# Patient Record
Sex: MALE | Race: BLACK OR AFRICAN AMERICAN | NOT HISPANIC OR LATINO
[De-identification: names, ages, dates, MRNs, and addresses within clinical notes are randomized per-mention and may not be internally consistent; named-entity substitution may affect disease eponyms.]

---

## 2023-01-09 PROBLEM — Z00.00 ENCOUNTER FOR PREVENTIVE HEALTH EXAMINATION: Status: ACTIVE | Noted: 2023-01-09

## 2023-01-18 ENCOUNTER — TRANSCRIPTION ENCOUNTER (OUTPATIENT)
Age: 68
End: 2023-01-18

## 2023-01-24 ENCOUNTER — TRANSCRIPTION ENCOUNTER (OUTPATIENT)
Age: 68
End: 2023-01-24

## 2024-06-04 ENCOUNTER — TRANSCRIPTION ENCOUNTER (OUTPATIENT)
Age: 69
End: 2024-06-04

## 2024-06-05 ENCOUNTER — INPATIENT (INPATIENT)
Facility: HOSPITAL | Age: 69
LOS: 7 days | Discharge: NURSING, REHAB OR LONG-TERM CARE | End: 2024-06-13
Attending: HOSPITALIST | Admitting: HOSPITALIST
Payer: MEDICARE

## 2024-06-05 VITALS
RESPIRATION RATE: 16 BRPM | TEMPERATURE: 99 F | WEIGHT: 250 LBS | DIASTOLIC BLOOD PRESSURE: 78 MMHG | SYSTOLIC BLOOD PRESSURE: 136 MMHG | HEART RATE: 78 BPM | OXYGEN SATURATION: 98 % | HEIGHT: 74 IN

## 2024-06-05 DIAGNOSIS — F20.9 SCHIZOPHRENIA, UNSPECIFIED: ICD-10-CM

## 2024-06-05 DIAGNOSIS — M27.2 INFLAMMATORY CONDITIONS OF JAWS: ICD-10-CM

## 2024-06-05 DIAGNOSIS — E87.1 HYPO-OSMOLALITY AND HYPONATREMIA: ICD-10-CM

## 2024-06-05 DIAGNOSIS — J44.9 CHRONIC OBSTRUCTIVE PULMONARY DISEASE, UNSPECIFIED: ICD-10-CM

## 2024-06-05 DIAGNOSIS — K12.2 CELLULITIS AND ABSCESS OF MOUTH: ICD-10-CM

## 2024-06-05 DIAGNOSIS — Z29.9 ENCOUNTER FOR PROPHYLACTIC MEASURES, UNSPECIFIED: ICD-10-CM

## 2024-06-05 DIAGNOSIS — Z87.438 PERSONAL HISTORY OF OTHER DISEASES OF MALE GENITAL ORGANS: ICD-10-CM

## 2024-06-05 DIAGNOSIS — I10 ESSENTIAL (PRIMARY) HYPERTENSION: ICD-10-CM

## 2024-06-05 LAB
ADD ON TEST-SPECIMEN IN LAB: SIGNIFICANT CHANGE UP
ALBUMIN SERPL ELPH-MCNC: 2.9 G/DL — LOW (ref 3.3–5)
ALP SERPL-CCNC: 71 U/L — SIGNIFICANT CHANGE UP (ref 40–120)
ALT FLD-CCNC: 9 U/L — SIGNIFICANT CHANGE UP (ref 4–41)
ANION GAP SERPL CALC-SCNC: 14 MMOL/L — SIGNIFICANT CHANGE UP (ref 7–14)
APPEARANCE UR: ABNORMAL
APTT BLD: 35.7 SEC — HIGH (ref 24.5–35.6)
AST SERPL-CCNC: 8 U/L — SIGNIFICANT CHANGE UP (ref 4–40)
BACTERIA # UR AUTO: NEGATIVE /HPF — SIGNIFICANT CHANGE UP
BASOPHILS # BLD AUTO: 0.04 K/UL — SIGNIFICANT CHANGE UP (ref 0–0.2)
BASOPHILS NFR BLD AUTO: 0.3 % — SIGNIFICANT CHANGE UP (ref 0–2)
BILIRUB SERPL-MCNC: 0.4 MG/DL — SIGNIFICANT CHANGE UP (ref 0.2–1.2)
BILIRUB UR-MCNC: NEGATIVE — SIGNIFICANT CHANGE UP
BLD GP AB SCN SERPL QL: NEGATIVE — SIGNIFICANT CHANGE UP
BUN SERPL-MCNC: 21 MG/DL — SIGNIFICANT CHANGE UP (ref 7–23)
CALCIUM SERPL-MCNC: 8.9 MG/DL — SIGNIFICANT CHANGE UP (ref 8.4–10.5)
CAST: 0 /LPF — SIGNIFICANT CHANGE UP (ref 0–4)
CHLORIDE SERPL-SCNC: 93 MMOL/L — LOW (ref 98–107)
CO2 SERPL-SCNC: 22 MMOL/L — SIGNIFICANT CHANGE UP (ref 22–31)
COLOR SPEC: YELLOW — SIGNIFICANT CHANGE UP
CREAT ?TM UR-MCNC: 115 MG/DL — SIGNIFICANT CHANGE UP
CREAT SERPL-MCNC: 1.17 MG/DL — SIGNIFICANT CHANGE UP (ref 0.5–1.3)
DIFF PNL FLD: ABNORMAL
EGFR: 68 ML/MIN/1.73M2 — SIGNIFICANT CHANGE UP
EOSINOPHIL # BLD AUTO: 0.12 K/UL — SIGNIFICANT CHANGE UP (ref 0–0.5)
EOSINOPHIL NFR BLD AUTO: 0.9 % — SIGNIFICANT CHANGE UP (ref 0–6)
GLUCOSE BLDC GLUCOMTR-MCNC: 97 MG/DL — SIGNIFICANT CHANGE UP (ref 70–99)
GLUCOSE SERPL-MCNC: 84 MG/DL — SIGNIFICANT CHANGE UP (ref 70–99)
GLUCOSE UR QL: NEGATIVE MG/DL — SIGNIFICANT CHANGE UP
HCT VFR BLD CALC: 31.3 % — LOW (ref 39–50)
HGB BLD-MCNC: 10.6 G/DL — LOW (ref 13–17)
IANC: 10.15 K/UL — HIGH (ref 1.8–7.4)
IMM GRANULOCYTES NFR BLD AUTO: 0.8 % — SIGNIFICANT CHANGE UP (ref 0–0.9)
INR BLD: 1.09 RATIO — SIGNIFICANT CHANGE UP (ref 0.85–1.18)
KETONES UR-MCNC: 15 MG/DL
LEUKOCYTE ESTERASE UR-ACNC: ABNORMAL
LYMPHOCYTES # BLD AUTO: 1.46 K/UL — SIGNIFICANT CHANGE UP (ref 1–3.3)
LYMPHOCYTES # BLD AUTO: 10.9 % — LOW (ref 13–44)
MCHC RBC-ENTMCNC: 28.1 PG — SIGNIFICANT CHANGE UP (ref 27–34)
MCHC RBC-ENTMCNC: 33.9 GM/DL — SIGNIFICANT CHANGE UP (ref 32–36)
MCV RBC AUTO: 83 FL — SIGNIFICANT CHANGE UP (ref 80–100)
MONOCYTES # BLD AUTO: 1.54 K/UL — HIGH (ref 0–0.9)
MONOCYTES NFR BLD AUTO: 11.5 % — SIGNIFICANT CHANGE UP (ref 2–14)
NEUTROPHILS # BLD AUTO: 10.15 K/UL — HIGH (ref 1.8–7.4)
NEUTROPHILS NFR BLD AUTO: 75.6 % — SIGNIFICANT CHANGE UP (ref 43–77)
NITRITE UR-MCNC: NEGATIVE — SIGNIFICANT CHANGE UP
NRBC # BLD: 0 /100 WBCS — SIGNIFICANT CHANGE UP (ref 0–0)
NRBC # FLD: 0 K/UL — SIGNIFICANT CHANGE UP (ref 0–0)
OSMOLALITY UR: 564 MOSM/KG — SIGNIFICANT CHANGE UP (ref 50–1200)
PH UR: 6 — SIGNIFICANT CHANGE UP (ref 5–8)
PLATELET # BLD AUTO: 325 K/UL — SIGNIFICANT CHANGE UP (ref 150–400)
POTASSIUM SERPL-MCNC: 4.4 MMOL/L — SIGNIFICANT CHANGE UP (ref 3.5–5.3)
POTASSIUM SERPL-SCNC: 4.4 MMOL/L — SIGNIFICANT CHANGE UP (ref 3.5–5.3)
POTASSIUM UR-SCNC: 34.4 MMOL/L — SIGNIFICANT CHANGE UP
PROT ?TM UR-MCNC: 66 MG/DL — SIGNIFICANT CHANGE UP
PROT SERPL-MCNC: 6.7 G/DL — SIGNIFICANT CHANGE UP (ref 6–8.3)
PROT UR-MCNC: 30 MG/DL
PROT/CREAT UR-RTO: 0.6 RATIO — HIGH (ref 0–0.2)
PROTHROM AB SERPL-ACNC: 12.3 SEC — SIGNIFICANT CHANGE UP (ref 9.5–13)
RBC # BLD: 3.77 M/UL — LOW (ref 4.2–5.8)
RBC # FLD: 14.2 % — SIGNIFICANT CHANGE UP (ref 10.3–14.5)
RBC CASTS # UR COMP ASSIST: 3 /HPF — SIGNIFICANT CHANGE UP (ref 0–4)
RH IG SCN BLD-IMP: POSITIVE — SIGNIFICANT CHANGE UP
SODIUM SERPL-SCNC: 129 MMOL/L — LOW (ref 135–145)
SODIUM UR-SCNC: 62 MMOL/L — SIGNIFICANT CHANGE UP
SP GR SPEC: 1.03 — SIGNIFICANT CHANGE UP (ref 1–1.03)
SQUAMOUS # UR AUTO: 4 /HPF — SIGNIFICANT CHANGE UP (ref 0–5)
TSH SERPL-MCNC: 1.74 UIU/ML — SIGNIFICANT CHANGE UP (ref 0.27–4.2)
UROBILINOGEN FLD QL: 2 MG/DL (ref 0.2–1)
UUN UR-MCNC: 769 MG/DL — SIGNIFICANT CHANGE UP
WBC # BLD: 13.42 K/UL — HIGH (ref 3.8–10.5)
WBC # FLD AUTO: 13.42 K/UL — HIGH (ref 3.8–10.5)
WBC UR QL: 21 /HPF — HIGH (ref 0–5)

## 2024-06-05 PROCEDURE — 99285 EMERGENCY DEPT VISIT HI MDM: CPT

## 2024-06-05 PROCEDURE — 21025 EXCISION OF BONE LOWER JAW: CPT

## 2024-06-05 PROCEDURE — 88305 TISSUE EXAM BY PATHOLOGIST: CPT | Mod: 26

## 2024-06-05 PROCEDURE — 41017 DRAINAGE OF MOUTH LESION: CPT

## 2024-06-05 PROCEDURE — 88311 DECALCIFY TISSUE: CPT | Mod: 26

## 2024-06-05 PROCEDURE — 99223 1ST HOSP IP/OBS HIGH 75: CPT

## 2024-06-05 RX ORDER — OLANZAPINE 15 MG/1
10 TABLET, FILM COATED ORAL AT BEDTIME
Refills: 0 | Status: DISCONTINUED | OUTPATIENT
Start: 2024-06-05 | End: 2024-06-13

## 2024-06-05 RX ORDER — ACETAMINOPHEN 500 MG
650 TABLET ORAL EVERY 6 HOURS
Refills: 0 | Status: DISCONTINUED | OUTPATIENT
Start: 2024-06-05 | End: 2024-06-13

## 2024-06-05 RX ORDER — GABAPENTIN 400 MG/1
1 CAPSULE ORAL
Refills: 0 | DISCHARGE

## 2024-06-05 RX ORDER — GABAPENTIN 400 MG/1
300 CAPSULE ORAL EVERY 8 HOURS
Refills: 0 | Status: DISCONTINUED | OUTPATIENT
Start: 2024-06-05 | End: 2024-06-13

## 2024-06-05 RX ORDER — ALBUTEROL 90 UG/1
2 AEROSOL, METERED ORAL
Refills: 0 | DISCHARGE

## 2024-06-05 RX ORDER — BENZTROPINE MESYLATE 1 MG
1 TABLET ORAL
Refills: 0 | DISCHARGE

## 2024-06-05 RX ORDER — DOXAZOSIN MESYLATE 4 MG
1 TABLET ORAL
Refills: 0 | DISCHARGE

## 2024-06-05 RX ORDER — LANOLIN ALCOHOL/MO/W.PET/CERES
3 CREAM (GRAM) TOPICAL AT BEDTIME
Refills: 0 | Status: DISCONTINUED | OUTPATIENT
Start: 2024-06-05 | End: 2024-06-13

## 2024-06-05 RX ORDER — SENNA PLUS 8.6 MG/1
2 TABLET ORAL AT BEDTIME
Refills: 0 | Status: DISCONTINUED | OUTPATIENT
Start: 2024-06-05 | End: 2024-06-13

## 2024-06-05 RX ORDER — FINASTERIDE 5 MG/1
1 TABLET, FILM COATED ORAL
Refills: 0 | DISCHARGE

## 2024-06-05 RX ORDER — DOXAZOSIN MESYLATE 4 MG
4 TABLET ORAL AT BEDTIME
Refills: 0 | Status: DISCONTINUED | OUTPATIENT
Start: 2024-06-05 | End: 2024-06-13

## 2024-06-05 RX ORDER — SODIUM CHLORIDE 9 MG/ML
1000 INJECTION INTRAMUSCULAR; INTRAVENOUS; SUBCUTANEOUS
Refills: 0 | Status: DISCONTINUED | OUTPATIENT
Start: 2024-06-05 | End: 2024-06-09

## 2024-06-05 RX ORDER — HYDROMORPHONE HYDROCHLORIDE 2 MG/ML
1 INJECTION INTRAMUSCULAR; INTRAVENOUS; SUBCUTANEOUS EVERY 6 HOURS
Refills: 0 | Status: DISCONTINUED | OUTPATIENT
Start: 2024-06-05 | End: 2024-06-07

## 2024-06-05 RX ORDER — VANCOMYCIN HCL 1 G
1000 VIAL (EA) INTRAVENOUS ONCE
Refills: 0 | Status: COMPLETED | OUTPATIENT
Start: 2024-06-05 | End: 2024-06-05

## 2024-06-05 RX ORDER — OLANZAPINE 15 MG/1
1 TABLET, FILM COATED ORAL
Refills: 0 | DISCHARGE

## 2024-06-05 RX ORDER — DIVALPROEX SODIUM 500 MG/1
250 TABLET, DELAYED RELEASE ORAL EVERY 8 HOURS
Refills: 0 | Status: DISCONTINUED | OUTPATIENT
Start: 2024-06-05 | End: 2024-06-13

## 2024-06-05 RX ORDER — BUDESONIDE AND FORMOTEROL FUMARATE DIHYDRATE 160; 4.5 UG/1; UG/1
2 AEROSOL RESPIRATORY (INHALATION)
Refills: 0 | DISCHARGE

## 2024-06-05 RX ORDER — ONDANSETRON 8 MG/1
4 TABLET, FILM COATED ORAL ONCE
Refills: 0 | Status: DISCONTINUED | OUTPATIENT
Start: 2024-06-05 | End: 2024-06-06

## 2024-06-05 RX ORDER — PIPERACILLIN AND TAZOBACTAM 4; .5 G/20ML; G/20ML
3.38 INJECTION, POWDER, LYOPHILIZED, FOR SOLUTION INTRAVENOUS ONCE
Refills: 0 | Status: COMPLETED | OUTPATIENT
Start: 2024-06-05 | End: 2024-06-05

## 2024-06-05 RX ORDER — SODIUM CHLORIDE 9 MG/ML
1000 INJECTION, SOLUTION INTRAVENOUS
Refills: 0 | Status: DISCONTINUED | OUTPATIENT
Start: 2024-06-05 | End: 2024-06-06

## 2024-06-05 RX ORDER — OLANZAPINE 15 MG/1
5 TABLET, FILM COATED ORAL ONCE
Refills: 0 | Status: COMPLETED | OUTPATIENT
Start: 2024-06-05 | End: 2024-06-05

## 2024-06-05 RX ORDER — DIVALPROEX SODIUM 500 MG/1
1 TABLET, DELAYED RELEASE ORAL
Refills: 0 | DISCHARGE

## 2024-06-05 RX ORDER — BENZTROPINE MESYLATE 1 MG
0.5 TABLET ORAL EVERY 12 HOURS
Refills: 0 | Status: DISCONTINUED | OUTPATIENT
Start: 2024-06-05 | End: 2024-06-13

## 2024-06-05 RX ORDER — BUDESONIDE AND FORMOTEROL FUMARATE DIHYDRATE 160; 4.5 UG/1; UG/1
2 AEROSOL RESPIRATORY (INHALATION)
Refills: 0 | Status: DISCONTINUED | OUTPATIENT
Start: 2024-06-05 | End: 2024-06-13

## 2024-06-05 RX ORDER — PIPERACILLIN AND TAZOBACTAM 4; .5 G/20ML; G/20ML
3.38 INJECTION, POWDER, LYOPHILIZED, FOR SOLUTION INTRAVENOUS EVERY 8 HOURS
Refills: 0 | Status: DISCONTINUED | OUTPATIENT
Start: 2024-06-05 | End: 2024-06-13

## 2024-06-05 RX ORDER — OXYCODONE HYDROCHLORIDE 5 MG/1
5 TABLET ORAL ONCE
Refills: 0 | Status: DISCONTINUED | OUTPATIENT
Start: 2024-06-05 | End: 2024-06-06

## 2024-06-05 RX ORDER — LABETALOL HCL 100 MG
200 TABLET ORAL EVERY 12 HOURS
Refills: 0 | Status: DISCONTINUED | OUTPATIENT
Start: 2024-06-05 | End: 2024-06-13

## 2024-06-05 RX ORDER — ONDANSETRON 8 MG/1
4 TABLET, FILM COATED ORAL EVERY 8 HOURS
Refills: 0 | Status: DISCONTINUED | OUTPATIENT
Start: 2024-06-05 | End: 2024-06-13

## 2024-06-05 RX ORDER — LABETALOL HCL 100 MG
1 TABLET ORAL
Refills: 0 | DISCHARGE

## 2024-06-05 RX ORDER — FLUPHENAZINE HYDROCHLORIDE 1 MG/1
62.5 TABLET, FILM COATED ORAL
Refills: 0 | DISCHARGE

## 2024-06-05 RX ORDER — FINASTERIDE 5 MG/1
5 TABLET, FILM COATED ORAL DAILY
Refills: 0 | Status: DISCONTINUED | OUTPATIENT
Start: 2024-06-05 | End: 2024-06-13

## 2024-06-05 RX ORDER — VANCOMYCIN HCL 1 G
1250 VIAL (EA) INTRAVENOUS EVERY 12 HOURS
Refills: 0 | Status: DISCONTINUED | OUTPATIENT
Start: 2024-06-05 | End: 2024-06-06

## 2024-06-05 RX ORDER — HYDROMORPHONE HYDROCHLORIDE 2 MG/ML
0.5 INJECTION INTRAMUSCULAR; INTRAVENOUS; SUBCUTANEOUS
Refills: 0 | Status: DISCONTINUED | OUTPATIENT
Start: 2024-06-05 | End: 2024-06-07

## 2024-06-05 RX ORDER — HYDROMORPHONE HYDROCHLORIDE 2 MG/ML
0.5 INJECTION INTRAMUSCULAR; INTRAVENOUS; SUBCUTANEOUS
Refills: 0 | Status: DISCONTINUED | OUTPATIENT
Start: 2024-06-05 | End: 2024-06-06

## 2024-06-05 RX ADMIN — PIPERACILLIN AND TAZOBACTAM 25 GRAM(S): 4; .5 INJECTION, POWDER, LYOPHILIZED, FOR SOLUTION INTRAVENOUS at 10:03

## 2024-06-05 RX ADMIN — PIPERACILLIN AND TAZOBACTAM 200 GRAM(S): 4; .5 INJECTION, POWDER, LYOPHILIZED, FOR SOLUTION INTRAVENOUS at 05:27

## 2024-06-05 RX ADMIN — Medication 166.67 MILLIGRAM(S): at 19:14

## 2024-06-05 RX ADMIN — SENNA PLUS 2 TABLET(S): 8.6 TABLET ORAL at 23:55

## 2024-06-05 RX ADMIN — HYDROMORPHONE HYDROCHLORIDE 1 MILLIGRAM(S): 2 INJECTION INTRAMUSCULAR; INTRAVENOUS; SUBCUTANEOUS at 07:19

## 2024-06-05 RX ADMIN — SODIUM CHLORIDE 75 MILLILITER(S): 9 INJECTION, SOLUTION INTRAVENOUS at 22:35

## 2024-06-05 RX ADMIN — Medication 250 MILLIGRAM(S): at 06:08

## 2024-06-05 RX ADMIN — DIVALPROEX SODIUM 250 MILLIGRAM(S): 500 TABLET, DELAYED RELEASE ORAL at 14:29

## 2024-06-05 RX ADMIN — GABAPENTIN 300 MILLIGRAM(S): 400 CAPSULE ORAL at 14:29

## 2024-06-05 RX ADMIN — DIVALPROEX SODIUM 250 MILLIGRAM(S): 500 TABLET, DELAYED RELEASE ORAL at 23:54

## 2024-06-05 RX ADMIN — Medication 0.5 MILLIGRAM(S): at 19:14

## 2024-06-05 RX ADMIN — Medication 200 MILLIGRAM(S): at 19:13

## 2024-06-05 RX ADMIN — SODIUM CHLORIDE 75 MILLILITER(S): 9 INJECTION INTRAMUSCULAR; INTRAVENOUS; SUBCUTANEOUS at 07:14

## 2024-06-05 RX ADMIN — FINASTERIDE 5 MILLIGRAM(S): 5 TABLET, FILM COATED ORAL at 14:29

## 2024-06-05 RX ADMIN — Medication 100 MILLIGRAM(S): at 04:40

## 2024-06-05 RX ADMIN — Medication 4 MILLIGRAM(S): at 23:55

## 2024-06-05 RX ADMIN — OLANZAPINE 5 MILLIGRAM(S): 15 TABLET, FILM COATED ORAL at 16:14

## 2024-06-05 RX ADMIN — BUDESONIDE AND FORMOTEROL FUMARATE DIHYDRATE 2 PUFF(S): 160; 4.5 AEROSOL RESPIRATORY (INHALATION) at 10:03

## 2024-06-05 RX ADMIN — PIPERACILLIN AND TAZOBACTAM 25 GRAM(S): 4; .5 INJECTION, POWDER, LYOPHILIZED, FOR SOLUTION INTRAVENOUS at 23:11

## 2024-06-05 RX ADMIN — GABAPENTIN 300 MILLIGRAM(S): 400 CAPSULE ORAL at 23:10

## 2024-06-05 RX ADMIN — OLANZAPINE 10 MILLIGRAM(S): 15 TABLET, FILM COATED ORAL at 23:55

## 2024-06-05 NOTE — ED ADULT NURSE NOTE - CHIEF COMPLAINT QUOTE
Pt transferred from [facility] for OMFS consult, CT showed osteomyelitis to right mandible with abscess. Arrives with 18GIV in [facility], received 5mg of Morphine and 900mg of Clindamycin at 2006. Past medical history of schizophrenia.

## 2024-06-05 NOTE — ED PROVIDER NOTE - ATTENDING CONTRIBUTION TO CARE
HPI: 68-year-old male history of schizophrenia, hypertension, presents as transfer from [facility] for osteomyelitis of the right mandible with abscess.  Patient states 4 days ago woke up with right facial swelling that has progressively worsened and became tender.  Denies fevers, trouble swallowing, neck pain, recent falls or injuries.      EXAM: Afebrile, not tachycardic, right mandibular swelling with tenderness to palpation.  No gum fluctuance or discharge, no teeth, no dentures. lungs clear to auscultation.  abd softnontender, moving all 4 ext actively without gross MSK deformities.     MDM: 67 yo M with history of Schizophrenia, HTN that was transferred from [facility] for findings concerning for osteomyelitis of right mandible where he has swelling. Per chart review patient received morphine and clindamycin at outside hospital appox 8 hours prior to arrival. .  Will continue antibiotics and discuss with OMFS and appreciate recommendations.

## 2024-06-05 NOTE — ED ADULT TRIAGE NOTE - CHIEF COMPLAINT QUOTE
Pt transferred from [facility] for OMFS consult, CT showed osteomyelitis to right mandible with abscess. Arrives with 18GIV in [facility], received 5mg of Morphine and 900mg of Clindamycin at 2006. Past medical history of schizophrenia.

## 2024-06-05 NOTE — ED PROVIDER NOTE - PHYSICAL EXAMINATION
GEN: NAD. A&Ox3. Non-toxic appearing.  HEENT: diffuse right mandibular swelling with ttp, neck with full ROM EOMI, PERRL, no scleral icterus, moist MM, edentulous   CARDIAC: RRR, S1, S2, no murmur. Radial pulses  present and symmetric b/l  PULM: CTA B/L no wheeze, rhonchi, rales.   ABD: soft NT, ND, no rebound no guarding  MSK: Moving all extremities, no edema  NEURO: no focal neurological deficits, CN 2-12 grossly intact  SKIN: warm, dry, no rash.

## 2024-06-05 NOTE — PATIENT PROFILE ADULT - DO YOU LACK THE NECESSARY SUPPORT TO HELP YOU COPE WITH LIFE CHALLENGES?
Chief Complaint   Patient presents with    New Patient     new - liver transplant eval       Vitals were taken, medications reconciled.    Zayra Jackson, Facilitator   7:50 AM    
no

## 2024-06-05 NOTE — H&P ADULT - PROBLEM SELECTOR PLAN 2
Suspect SIADH due to pain; Sodium 129    -Pain control as above  -Check TSH (added on)   -Monitor response to IV NS  -f/u  repeat BMP ordered for 10am (primary team)

## 2024-06-05 NOTE — PROGRESS NOTE ADULT - PROBLEM SELECTOR PLAN 2
Suspect SIADH due to pain; Sodium 129    -Pain control as above  -Check TSH (added on)   -Monitor response to IV NS  -f/u  repeat BMP ordered for 10am (primary team) Suspect SIADH due to pain; Sodium 129    -Pain control as above  -Check TSH (added on)   -Monitor response to IV NS  -f/u urine lytes

## 2024-06-05 NOTE — PROGRESS NOTE ADULT - ATTENDING COMMENTS
67yo male, smoker, ambulatory with a walker, history of schizophrenia (hallucinations with televisions), HTN, COPD, BPH,  muscle weakness, spondylosis with myelopathy, mild pulmonary HTN and moderate tricuspid regurgitation (TTE 2022); Presents as transfer from [facility] w mandible pain found w severe bony erosion of the right mandible with abscess   - OMFS plan for add-on OR today - on zosyn, will add vanco, ID consult - f.u OR cx and path  -hyponatremia - clinically euvolemic - check lytes and trial of IVF  rest of care as above

## 2024-06-05 NOTE — PROGRESS NOTE ADULT - PROBLEM SELECTOR PLAN 3
No formal dx per the housing documentation, however, pt on typical COPD medications and active smoker;    -c/w Symbicort   -Would start ppx Duoneb post OR   -Monitor pulse-ox closely No formal dx per the housing documentation, however, pt on typical COPD medications and active smoker    -c/w Symbicort   -Would start ppx Duoneb post OR   -Monitor pulse-ox closely

## 2024-06-05 NOTE — PRE-OP CHECKLIST - BOWEL PREP
n/a Xolair Counseling:  Patient informed of potential adverse effects including but not limited to fever, muscle aches, rash and allergic reactions.  The patient verbalized understanding of the proper use and possible adverse effects of Xolair.  All of the patient's questions and concerns were addressed.

## 2024-06-05 NOTE — H&P ADULT - TIME BILLING
Reviewed admission imaging reports, and admission labs prior to the encounter with  the patient   Reviewed Triage and ED course/ documentation   Reviewed consultants notes, including::: OMFS  Performed full physical exam and ROS  Reviewed transfer documents from [facility]  Obtained list of multiple home medications and performed home medications reconciliation on EMR  Discussed treatment and further plans in simple terms with the patient   Ordered or reviewed new admission medications and placed or reviewed all hospitalization admission orders  Managed (continued, held or adjusted doses)  multiple home medications   Ordered  or reviewed orders of  new imaging and new lab w/up

## 2024-06-05 NOTE — H&P ADULT - HISTORY OF PRESENT ILLNESS
68-year-old male history of schizophrenia, hypertension, presents as transfer from [facility] for osteomyelitis of the right mandible with abscess.  Patient states 4 days ago woke up with right facial swelling that has progressively worsened and became tender. Reports no fever, difficulty or pain on swallowing, neck pain, recent falls or injuries.     FYI: received morphine and clindamycin at outside hospital. OMFS c/s 68-year-old male, smoker, ambulatory with a walker,  history of schizophrenia (hallucinations with televisions), hypertension, ?COPD, BPH,  muscle weakness, spondylosis with myelopathy, presents as transfer from [facility] for osteomyelitis of the right mandible with abscess.  Patient states 4 days ago woke up with right facial swelling that has progressively worsened and became tender. Reports no fever, difficulty or pain on swallowing, neck pain, recent falls or injuries.     FYI: received morphine and clindamycin at outside hospital. OMFS c/s 68-year-old male, smoker, ambulatory with a walker, history of schizophrenia (hallucinations with televisions), hypertension, COPD, BPH,  muscle weakness, spondylosis with myelopathy, mild pulmonary HTN and moderate tricuspid regurgitation (TTE 2022); Pt presents as transfer from [facility] for osteomyelitis of the right mandible with abscess.  Patient states 4 days ago woke up with right facial swelling that has progressively worsened and became tender. Reports no fever, difficulty or pain on swallowing, neck pain, recent falls or injuries.     FYI: received morphine and clindamycin at outside hospital. OMFS c/s

## 2024-06-05 NOTE — PROGRESS NOTE ADULT - SUBJECTIVE AND OBJECTIVE BOX
*******************************  Blanca Tucker MD (PGY-1)  Internal Medicine  Contact via [employer] TEAMS  *******************************    INTERVAL HPI/OVERNIGHT EVENTS:      OBJECTIVE  T(C): 36.6 (06-05-24 @ 08:00), Max: 37 (06-05-24 @ 03:56)  HR: 76 (06-05-24 @ 08:00) (76 - 78)  BP: 118/67 (06-05-24 @ 08:00) (118/67 - 136/78)  RR: 19 (06-05-24 @ 08:00) (16 - 19)  SpO2: 97% (06-05-24 @ 08:00) (97% - 98%)      PHYSICAL EXAM  General:  HEENT:  Cardiovascular:  Pulmonary:  GI:  :  Neuro:  Psych:          IMAGING:     *******************************  Blanca Tucker MD (PGY-1)  Internal Medicine  Contact via [employer] TEAMS  *******************************    INTERVAL HPI/OVERNIGHT EVENTS:  Admitted overnight for mandibular osteomyelitis w/ abscess, planned for I&D today with OMFS.     OBJECTIVE  T(C): 36.6 (06-05-24 @ 08:00), Max: 37 (06-05-24 @ 03:56)  HR: 76 (06-05-24 @ 08:00) (76 - 78)  BP: 118/67 (06-05-24 @ 08:00) (118/67 - 136/78)  RR: 19 (06-05-24 @ 08:00) (16 - 19)  SpO2: 97% (06-05-24 @ 08:00) (97% - 98%)      PHYSICAL EXAM  GENERAL: NAD, lying in bed comfortably  EYES: EOMI, PERRLA, conjunctiva and sclera clear  HEENT : +R. sided facial and submandibular edema and tenderness, muffled voice and mild trismus, no drooling  HEART: Regular rate and rhythm, no murmurs, rubs, or gallops  LUNGS: Unlabored respirations.  Clear to auscultation bilaterally, no crackles, wheezing, or rhonchi  ABDOMEN: Soft, nontender, nondistended, +BS  EXTREMITIES: 2+ peripheral pulses bilaterally. No clubbing, cyanosis, or edema  NERVOUS SYSTEM:  A&Ox3, moving all extremities, no focal deficits         IMAGING:

## 2024-06-05 NOTE — PATIENT PROFILE ADULT - NSPROEXTENSIONSOFSELF_GEN_A_NUR
Pt given discharge instructions, patient education, 2 prescriptions, and follow up information. Pt verbalizes understanding. All questions answered. Pt discharged to home in private vehicle, ambulatory. Pt A/Ox4, RA, pain controlled.
none

## 2024-06-05 NOTE — PATIENT PROFILE ADULT - FALL HARM RISK - HARM RISK INTERVENTIONS
Assistance with ambulation/Assistance OOB with selected safe patient handling equipment/Communicate Risk of Fall with Harm to all staff/Reinforce activity limits and safety measures with patient and family/Tailored Fall Risk Interventions/Use of alarms - bed, chair and/or voice tab/Visual Cue: Yellow wristband and red socks/Bed in lowest position, wheels locked, appropriate side rails in place/Call bell, personal items and telephone in reach/Instruct patient to call for assistance before getting out of bed or chair/Non-slip footwear when patient is out of bed/[place] to call system/Physically safe environment - no spills, clutter or unnecessary equipment/Purposeful Proactive Rounding/Room/bathroom lighting operational, light cord in reach

## 2024-06-05 NOTE — PROGRESS NOTE ADULT - PROBLEM SELECTOR PLAN 5
On Prolixin Decanoate [id number]     c/w Zyprexa, Depakote  check Valproic Acid level (added on)  check TSH On Prolixin Decanoate [id number]     c/w Zyprexa, Depakote  f/u Valproic Acid level (added on)

## 2024-06-05 NOTE — PATIENT PROFILE ADULT - FUNCTIONAL ASSESSMENT - BASIC MOBILITY 6.
2-calculated by average/Not able to assess (calculate score using [facility] averaging method)

## 2024-06-05 NOTE — ED PROVIDER NOTE - PROGRESS NOTE DETAILS
Discussed with OMFS.  They are unsure if will go to operating room.  Would recommend continuing with IV antibiotics.  Discussed with hospitalist, recommending dose of Vanco and Zosyn at this time.  To be admitted.  -Kayode Rodgers PGY-2

## 2024-06-05 NOTE — H&P ADULT - NSICDXPASTMEDICALHX_GEN_ALL_CORE_FT
PAST MEDICAL HISTORY:  HTN (hypertension)     Schizophrenia      PAST MEDICAL HISTORY:  History of BPH     HTN (hypertension)     Muscle weakness     Schizophrenia

## 2024-06-05 NOTE — PRE-OP CHECKLIST - BSA (M2)
2.32 Suturegard Intro: Intraoperative tissue expansion was performed, utilizing the SUTUREGARD device, in order to reduce wound tension.

## 2024-06-05 NOTE — H&P ADULT - SKIN
warm and dry/color normal Protopic Counseling: Patient may experience a mild burning sensation during topical application. Protopic is not approved in children less than 2 years of age. There have been case reports of hematologic and skin malignancies in patients using topical calcineurin inhibitors although causality is questionable.

## 2024-06-05 NOTE — H&P ADULT - PROBLEM SELECTOR PLAN 5
On Prolixin Decanoate [id number]     c/w Zyprexa, Depakote  check Valproic Acid level (added on)  check TSH

## 2024-06-05 NOTE — CONSULT NOTE ADULT - SUBJECTIVE AND OBJECTIVE BOX
OMFS Consult Note:    ELAINE HERNANDEZ  MRN-[id number]  [facility] ED.    HPI:      PMH: HTN, Schizophrenia  PSH: Pt denies  Meds: Pt not sure of names  All: Haldol, penicillin, Thorazine        Review of systems:  Denies fever, chills. denies LOC. denies nausea/vomiting/headache. denies CP, SOB, cough. Denies palpatitions. denies blurred/double vision. denies dyspahgia, dyspnea.      T(F): 98.6 (06-05-24 @ 03:56), Max: 98.6 (06-05-24 @ 03:56)  HR: 78 (06-05-24 @ 03:56) (78 - 78)  BP: 136/78 (06-05-24 @ 03:56) (136/78 - 136/78)  RR: 16 (06-05-24 @ 03:56) (16 - 16)  SpO2: 98% (06-05-24 @ 03:56) (98% - 98%)              Focused Maxillofacial Exam:  General: AAOx3, NAD  Face: Large R sided facial/ submandibular swelling, mild trismus to 25 mm, no lymphadenopathy. Inferior border of mandible is not palpable on R side  Oral: R posterior vestibular fullness, no purulence intraorally. FOM soft, non-tender. No dysphagia or odynophagia. Pt tolerating secretions.       IMAGING:     OMFS Consult Note:    ELAINE HERNANDEZ  MRN-[id number]  [facility] ED.    HPI:  68M w/ PMH of HTN, Schizophrenia, presents to [facility] ED as transfer from [facility] w/ 3 day history of R sided facial swelling. CT Neck taken at outside hospital revealed R  space abscess w/ underlying osteomyelitis of mandible vs. malignancy. On examination, pt w/ no acute complaints. Reports mild R sided facial pain. Otherwise, denies any fever, chills, difficulty breathing or swallowing or blurry vision. WBC 13.4       PMH: HTN, Schizophrenia  PSH: Pt denies  Meds: Pt not sure of names  All: Haldol, penicillin, Thorazine        Review of systems:  Denies fever, chills. denies LOC. denies nausea/vomiting/headache. denies CP, SOB, cough. Denies palpitations denies blurred/double vision. denies dysphagia dyspnea.      T(F): 98.6 (06-05-24 @ 03:56), Max: 98.6 (06-05-24 @ 03:56)  HR: 78 (06-05-24 @ 03:56) (78 - 78)  BP: 136/78 (06-05-24 @ 03:56) (136/78 - 136/78)  RR: 16 (06-05-24 @ 03:56) (16 - 16)  SpO2: 98% (06-05-24 @ 03:56) (98% - 98%)        Focused Maxillofacial Exam:  General: AAOx3, NAD  Face: Large R sided facial/ submandibular swelling, mild trismus to 25 mm, no lymphadenopathy. Inferior border of mandible is not palpable on R side  Oral: R posterior vestibular fullness, no purulence intraorally. FOM soft, non-tender. No dysphagia or odynophagia. Pt tolerating secretions.       IMAGING:    FINDINGS:    AERODIGESTIVE TRACT:  Nasopharynx, oropharynx and hypopharynx are normal in appearance. Larynx and visualized trachea are normal. Visualized esophagus is normal,    LYMPH NODES:  No adenopathy.    PAROTID GLANDS:  Bilateral fatty replacement.    SUBMANDIBULAR GLANDS:  Normal.    THYROID GLAND:  Normal.    VASCULAR STRUCTURES: Major vessels are patent.    VISUALIZED PARANASAL SINUSES:  Clear.    VISUALIZED TYMPANOMASTOID CAVITIES: Clear.    BONES:  Extensive aggressive appearing bone erosion throughout the mandible. Extensive degenerative changes in the visualized spine.    VISUALIZED LUNGS: Clear.    MISCELLANEOUS:  Swollen complex septated fluid appearance throughout the right  space involving both sides of the masseter muscle.      IMPRESSION:  Extensive aggressive appearing bone erosion throughout the mandible.  Swollen complex septated fluid appearance throughout the right  space involving both sides of the masseter muscle.  Severe osteomyelitis with abscess in the right  space versus malignancy.

## 2024-06-05 NOTE — H&P ADULT - NSHPPHYSICALEXAM_GEN_ALL_CORE
Vital Signs Last 24 Hrs  T(C): 37 (05 Jun 2024 03:56), Max: 37 (05 Jun 2024 03:56)  T(F): 98.6 (05 Jun 2024 03:56), Max: 98.6 (05 Jun 2024 03:56)  HR: 78 (05 Jun 2024 03:56) (78 - 78)  BP: 136/78 (05 Jun 2024 03:56) (136/78 - 136/78)  BP(mean): --  RR: 16 (05 Jun 2024 03:56) (16 - 16)  SpO2: 98% (05 Jun 2024 03:56) (98% - 98%)    Parameters below as of 05 Jun 2024 03:56  Patient On (Oxygen Delivery Method): room air

## 2024-06-05 NOTE — ED PROVIDER NOTE - CLINICAL SUMMARY MEDICAL DECISION MAKING FREE TEXT BOX
68-year-old male history of schizophrenia, hypertension, presents as transfer from [facility] for osteomyelitis of the right mandible with abscess.  Patient states 4 days ago woke up with right facial swelling that has progressively worsened and became tender.  Denies fevers, trouble swallowing, neck pain, recent falls or injuries.  Afebrile, not tachycardic, right mandibular swelling with tenderness to palpation.  No gum fluctuance or discharge, lungs clear to auscultation.  Per chart review patient received morphine and clindamycin at outside hospital.  Will continue antibiotics and discuss with OMFS (Imaging PACS under other MRN [id number])  68-year-old male history of schizophrenia, hypertension, presents as transfer from [facility] for osteomyelitis of the right mandible with abscess.  Patient states 4 days ago woke up with right facial swelling that has progressively worsened and became tender.  Denies fevers, trouble swallowing, neck pain, recent falls or injuries.  Afebrile, not tachycardic, right mandibular swelling with tenderness to palpation.  No gum fluctuance or discharge, lungs clear to auscultation.  Per chart review patient received morphine and clindamycin at outside hospital.  Will continue antibiotics and discuss with OMFS

## 2024-06-05 NOTE — H&P ADULT - MUSCULOSKELETAL
details… no joint erythema/no joint warmth/strength 5/5 bilateral upper extremities/strength 5/5 bilateral lower extremities

## 2024-06-05 NOTE — H&P ADULT - PROBLEM SELECTOR PLAN 3
No formal dx per the housing documentation, however, pt on typical COPD medications and active smoker;    -c/w Symbicort   -Would start ppx Duoneb post OR   -Monitor pulse-ox closely

## 2024-06-05 NOTE — CONSULT NOTE ADULT - ASSESSMENT
A/P:    Plan:      Will Lora DMD  Oral and Maxillofacial Surgery  [facility] Pager [phone]  Available on [employer] Teams A/P:  68M w/ PMH of HTN, Schizophrenia, presents to [facility] ED as transfer from [facility] w/ 3 day history of R sided facial swelling. OMFS consulted for surgical management of R  space abscess.     Plan:  -FINAL PLAN PENDING    -Recommend admission to medicine for IV Abx  -Keep NPO  -Pre-op labs: CBC, BMP, PTT, PT/INR, Type + Screen  -IV Fluids  -Multimodal pain control      Will Lora DMD  Oral and Maxillofacial Surgery  [facility] OMFS Pager [phone]  Available on Microsoft Teams A/P:  68M w/ PMH of HTN, Schizophrenia, presents to [facility] ED as transfer from [facility] w/ 3 day history of R sided facial swelling. OMFS consulted for surgical management of R  space abscess.     Plan:  -Pt add on #10 for OR today: Incision and Drainage of R  space abscess  -Recommend admission to medicine for IV Abx  -Keep NPO  -Pre-op labs: CBC, BMP, PTT, PT/INR, Type + Screen  -IV Fluids  -Multimodal pain control      Will Lora DMD  Oral and Maxillofacial Surgery  [facility] OMFS Pager [phone]  Available on Microsoft Teams A/P:  68M w/ PMH of HTN, Schizophrenia, presents to [facility] ED as transfer from [facility] w/ 3 day history of R sided facial swelling. OMFS consulted for surgical management of R  space abscess.     Plan:  -Pt add on #10 for OR today: Incision and Drainage of R  space abscess  -Recommend admission to medicine for IV Abx  -ID Consult  -Keep NPO  -Pre-op labs: CBC, BMP, PTT, PT/INR, Type + Screen  -IV Fluids  -Multimodal pain control      Will Lora DMD  Oral and Maxillofacial Surgery  [facility] OMFS Pager [phone]  Available on Microsoft Teams A/P:  68M w/ PMH of HTN, Schizophrenia, presents to [facility] ED as transfer from [facility] w/ 3 day history of R sided facial swelling. OMFS consulted for surgical management of R  space abscess.     Plan:  -Pt add on #10 for OR today: Incision and Drainage of R  space abscess  -Recommend admission to medicine for IV Abx  -ID Consult, may need PICC placement  -Keep NPO  -Pre-op labs: CBC, BMP, PTT, PT/INR, Type + Screen  -IV Fluids  -Multimodal pain control      Will Lora DMD  Oral and Maxillofacial Surgery  [facility] OMFS Pager [phone]  Available on [employer] Teams

## 2024-06-05 NOTE — H&P ADULT - REASON FOR ADMISSION
Transferred from [facility] for OMFS consult,  osteomyelitis to right mandible with abscess

## 2024-06-05 NOTE — PROGRESS NOTE ADULT - PROBLEM SELECTOR PLAN 1
CT Neck: Extensive aggressive appearing bone erosion throughout the mandible. Swollen complex septated fluid appearance throughout the right  space involving both sides of the masseter muscle. Severe osteomyelitis with abscess in the right  space versus malignancy.    -Evaluated by OMFS, planing for I&D as an add-on  -NPO  -IVF  -Monitor for fever/ sepsis, obtain BCx if spikes fever  -VS q4h  -Zosyn IV   -EKG reviewed, no acute changes  -TTE from 2022 reviewed: mild pulmonary HTN and moderate tricuspid regurgitation, EF=60%  -Pain control: Dilaudid IV PRN  -Senna CT Neck: Extensive aggressive appearing bone erosion throughout the mandible. Swollen complex septated fluid appearance throughout the right  space involving both sides of the masseter muscle. Severe osteomyelitis with abscess in the right  space versus malignancy.    -Evaluated by OMFS, planing for I&D as an add-on today 6/5  -NPO  -Monitor for fever/ sepsis, obtain BCx if spikes fever  -VS q4h  -Zosyn IV and vancomycin    -Pain control: Dilaudid IV PRN  -Senna CT Neck: Extensive aggressive appearing bone erosion throughout the mandible. Swollen complex septated fluid appearance throughout the right  space involving both sides of the masseter muscle. Severe osteomyelitis with abscess in the right  space versus malignancy.    -leukocytosis to 13, afebrile  -Evaluated by OMFS, planing for I&D as an add-on today 6/5  -NPO  -Monitor for fever/ sepsis, obtain BCx if febrile  -VS q4h  -C/w Zosyn IV and vancomycin    -Pain control: Dilaudid IV PRN  -ID consulted, appreciate recs  -Senna

## 2024-06-05 NOTE — H&P ADULT - NSHPLABSRESULTS_GEN_ALL_CORE
.    -Personally INTERPRETED EKG:      -Personally reviewed the following labs below:                        10.6   13.42 )-----------( 325      ( 05 Jun 2024 04:23 )             31.3   06-05    129<L>  |  93<L>  |  21  ----------------------------<  84  4.4   |  22  |  1.17    Ca    8.9      05 Jun 2024 04:23    TPro  6.7  /  Alb  2.9<L>  /  TBili  0.4  /  DBili  x   /  AST  8   /  ALT  9   /  AlkPhos  71  06-05 .    -Personally INTERPRETED EKG: NSR, 73bpm, qtc 394, biphasic Tw in I, aVL, no acute ST changes, no PACs or PVCs       -Personally reviewed the following labs below:                        10.6   13.42 )-----------( 325      ( 05 Jun 2024 04:23 )             31.3   06-05    129<L>  |  93<L>  |  21  ----------------------------<  84  4.4   |  22  |  1.17    Ca    8.9      05 Jun 2024 04:23    TPro  6.7  /  Alb  2.9<L>  /  TBili  0.4  /  DBili  x   /  AST  8   /  ALT  9   /  AlkPhos  71  06-05 .    -Personally INTERPRETED EKG: NSR, 73bpm, qtc 394, biphasic Tw in I, aVL, no acute ST changes, no PACs or PVCs       -Personally reviewed the following labs below:                        10.6   13.42 )-----------( 325      ( 05 Jun 2024 04:23 )             31.3   06-05    129<L>  |  93<L>  |  21  ----------------------------<  84  4.4   |  22  |  1.17    Ca    8.9      05 Jun 2024 04:23    TPro  6.7  /  Alb  2.9<L>  /  TBili  0.4  /  DBili  x   /  AST  8   /  ALT  9   /  AlkPhos  71  06-05      -Personally reviewed: CT NECK SOFT TISSUE IC   ORDERED BY: ANTOINE ESPANA  PROCEDURE DATE:  06/04/2024  INTERPRETATION:  CLINICAL INFORMATION: R jaw edema, r/o parotitis vs abscess.  COMPARISON: None.  CONTRAST:  IV Contrast: Omnipaque 350  90 cc administered  10 cc discarded  Complications: None reported at time of study completion  TECHNIQUE:  Axial images were obtained through the neck after the administration of intravenous contrast material. Sagittal and coronal reformatted images were obtained.  FINDINGS:  AERODIGESTIVE TRACT:  Nasopharynx, oropharynx and hypopharynx are normal in appearance. Larynx and visualized trachea are normal. Visualized esophagus is normal,  LYMPH NODES:  No adenopathy.  PAROTID GLANDS:  Bilateral fatty replacement.  SUBMANDIBULAR GLANDS:  Normal.  THYROID GLAND:  Normal.  VASCULAR STRUCTURES: Major vessels are patent.  VISUALIZED PARANASAL SINUSES:  Clear.  VISUALIZED TYMPANOMASTOID CAVITIES: Clear.  BONES:  Extensive aggressive appearing bone erosion throughout the mandible. Extensive degenerative changes in the visualized spine.  VISUALIZED LUNGS: Clear.  MISCELLANEOUS:  Swollen complex septated fluid appearance throughout the right  space involving both sides of the masseter muscle.  IMPRESSION:  Extensive aggressive appearing bone erosion throughout the mandible.  Swollen complex septated fluid appearance throughout the right  space involving both sides of the masseter muscle.  Severe osteomyelitis with abscess in the right  space versus malignancy.      -Personally reviewed: TTE   12/31/2022    CONCLUSIONS:  1. LV EF by visual estimation is 60%.  2. Diastolic function is indeterminate.  3. Normal left ventricular systolic function.  4. Estimated pulmonary artery systolic pressure equals 41.3 mmHg, assuming right atrial pressure equals 3 mmHg, consistent with mild pulmonary hypertension.  5. Moderate tricuspid regurgitation.  6. Mildly dilated right atrium.  7. Mildly dilated left atrium.

## 2024-06-05 NOTE — H&P ADULT - ASSESSMENT
69yo male, smoker, ambulatory with a walker, history of schizophrenia (hallucinations with televisions), HTN, COPD, BPH,  muscle weakness, spondylosis with myelopathy, mild pulmonary HTN and moderate tricuspid regurgitation (TTE 2022); Presents as transfer from [facility] a/w severe osteomyelitis of the right mandible with abscess with associated leukocytosis, r/o sepsis, r/o malignancy;

## 2024-06-05 NOTE — H&P ADULT - PROBLEM SELECTOR PLAN 4
-Hold Amlodipine and Enalapril given high risk for spesis and pending OR intervention by OMFS   -VS q4h  -c/w Labetalol

## 2024-06-05 NOTE — ED ADULT NURSE NOTE - OBJECTIVE STATEMENT
68 year old AO4 ambulatory with a walker c/o right sided jaw pain/swelling for the past couple of days. Pt is a transfer from [facility] with an 18g in the Right AC, flushed without resistance. PMHx of schizophrenia. Pt noted to be in no acute distress. Able to speak in full sentences, able to maintain airway. RR even and unlabored. Pending OMFS consult.

## 2024-06-05 NOTE — PROGRESS NOTE ADULT - ASSESSMENT
69yo male, smoker, ambulatory with a walker, history of schizophrenia (hallucinations with televisions), HTN, COPD, BPH,  muscle weakness, spondylosis with myelopathy, mild pulmonary HTN and moderate tricuspid regurgitation (TTE 2022); Presents as transfer from [facility] a/w severe osteomyelitis of the right mandible with abscess with associated leukocytosis, r/o sepsis, r/o malignancy; 67yo male, smoker, ambulatory with a walker, history of schizophrenia (hallucinations with televisions), HTN, COPD, BPH,  muscle weakness, spondylosis with myelopathy, mild pulmonary HTN and moderate tricuspid regurgitation (TTE 2022); Presents as transfer from [facility] a/w severe osteomyelitis of the right mandible with abscess with associated leukocytosis, r/o sepsis, r/o malignancy; 69yo male, PMHx smoking, schizophrenia, HTN, COPD, BPH,  muscle weakness, spondylosis with myelopathy, mild pulmonary HTN and moderate tricuspid regurgitation (TTE 2022); Presents as transfer from [facility] a/w severe osteomyelitis of the right mandible with abscess with associated leukocytosis.

## 2024-06-05 NOTE — H&P ADULT - PROBLEM SELECTOR PLAN 1
CT Neck: Extensive aggressive appearing bone erosion throughout the mandible. Swollen complex septated fluid appearance throughout the right  space involving both sides of the masseter muscle. Severe osteomyelitis with abscess in the right  space versus malignancy.    -Evaluated by OMFS, planing for I&D as an add-on  -NPO  -IVF  -Monitor for fever/ sepsis, obtain BCx if spikes fever  -VS q4h  -Zosyn IV   -EKG reviewed, no acute changes  -TTE from 2022 reviewed: mild pulmonary HTN and moderate tricuspid regurgitation, EF=60%  -Pain control: Dilaudid IV PRN  -Senna

## 2024-06-06 LAB
ALBUMIN SERPL ELPH-MCNC: 2.7 G/DL — LOW (ref 3.3–5)
ALP SERPL-CCNC: 65 U/L — SIGNIFICANT CHANGE UP (ref 40–120)
ALT FLD-CCNC: 9 U/L — SIGNIFICANT CHANGE UP (ref 4–41)
ANION GAP SERPL CALC-SCNC: 10 MMOL/L — SIGNIFICANT CHANGE UP (ref 7–14)
APTT BLD: 34.1 SEC — SIGNIFICANT CHANGE UP (ref 24.5–35.6)
AST SERPL-CCNC: 9 U/L — SIGNIFICANT CHANGE UP (ref 4–40)
BASOPHILS # BLD AUTO: 0.05 K/UL — SIGNIFICANT CHANGE UP (ref 0–0.2)
BASOPHILS NFR BLD AUTO: 0.5 % — SIGNIFICANT CHANGE UP (ref 0–2)
BILIRUB SERPL-MCNC: 0.4 MG/DL — SIGNIFICANT CHANGE UP (ref 0.2–1.2)
BUN SERPL-MCNC: 12 MG/DL — SIGNIFICANT CHANGE UP (ref 7–23)
CALCIUM SERPL-MCNC: 8.2 MG/DL — LOW (ref 8.4–10.5)
CHLORIDE SERPL-SCNC: 98 MMOL/L — SIGNIFICANT CHANGE UP (ref 98–107)
CO2 SERPL-SCNC: 22 MMOL/L — SIGNIFICANT CHANGE UP (ref 22–31)
CREAT SERPL-MCNC: 0.82 MG/DL — SIGNIFICANT CHANGE UP (ref 0.5–1.3)
EGFR: 96 ML/MIN/1.73M2 — SIGNIFICANT CHANGE UP
EOSINOPHIL # BLD AUTO: 0.11 K/UL — SIGNIFICANT CHANGE UP (ref 0–0.5)
EOSINOPHIL NFR BLD AUTO: 1 % — SIGNIFICANT CHANGE UP (ref 0–6)
GLUCOSE SERPL-MCNC: 97 MG/DL — SIGNIFICANT CHANGE UP (ref 70–99)
GRAM STN FLD: SIGNIFICANT CHANGE UP
GRAM STN FLD: SIGNIFICANT CHANGE UP
HCT VFR BLD CALC: 27.7 % — LOW (ref 39–50)
HGB BLD-MCNC: 9.6 G/DL — LOW (ref 13–17)
IANC: 8.04 K/UL — HIGH (ref 1.8–7.4)
IMM GRANULOCYTES NFR BLD AUTO: 0.7 % — SIGNIFICANT CHANGE UP (ref 0–0.9)
INR BLD: 1.14 RATIO — SIGNIFICANT CHANGE UP (ref 0.85–1.18)
LYMPHOCYTES # BLD AUTO: 1.38 K/UL — SIGNIFICANT CHANGE UP (ref 1–3.3)
LYMPHOCYTES # BLD AUTO: 12.6 % — LOW (ref 13–44)
MAGNESIUM SERPL-MCNC: 2 MG/DL — SIGNIFICANT CHANGE UP (ref 1.6–2.6)
MCHC RBC-ENTMCNC: 28.7 PG — SIGNIFICANT CHANGE UP (ref 27–34)
MCHC RBC-ENTMCNC: 34.7 GM/DL — SIGNIFICANT CHANGE UP (ref 32–36)
MCV RBC AUTO: 82.9 FL — SIGNIFICANT CHANGE UP (ref 80–100)
MONOCYTES # BLD AUTO: 1.25 K/UL — HIGH (ref 0–0.9)
MONOCYTES NFR BLD AUTO: 11.5 % — SIGNIFICANT CHANGE UP (ref 2–14)
NEUTROPHILS # BLD AUTO: 8.04 K/UL — HIGH (ref 1.8–7.4)
NEUTROPHILS NFR BLD AUTO: 73.7 % — SIGNIFICANT CHANGE UP (ref 43–77)
NRBC # BLD: 0 /100 WBCS — SIGNIFICANT CHANGE UP (ref 0–0)
NRBC # FLD: 0 K/UL — SIGNIFICANT CHANGE UP (ref 0–0)
PHOSPHATE SERPL-MCNC: 3.3 MG/DL — SIGNIFICANT CHANGE UP (ref 2.5–4.5)
PLATELET # BLD AUTO: 274 K/UL — SIGNIFICANT CHANGE UP (ref 150–400)
POTASSIUM SERPL-MCNC: 3.8 MMOL/L — SIGNIFICANT CHANGE UP (ref 3.5–5.3)
POTASSIUM SERPL-SCNC: 3.8 MMOL/L — SIGNIFICANT CHANGE UP (ref 3.5–5.3)
PROT SERPL-MCNC: 6.2 G/DL — SIGNIFICANT CHANGE UP (ref 6–8.3)
PROTHROM AB SERPL-ACNC: 12.8 SEC — SIGNIFICANT CHANGE UP (ref 9.5–13)
RBC # BLD: 3.34 M/UL — LOW (ref 4.2–5.8)
RBC # FLD: 14 % — SIGNIFICANT CHANGE UP (ref 10.3–14.5)
SODIUM SERPL-SCNC: 130 MMOL/L — LOW (ref 135–145)
SPECIMEN SOURCE: SIGNIFICANT CHANGE UP
SPECIMEN SOURCE: SIGNIFICANT CHANGE UP
WBC # BLD: 10.91 K/UL — HIGH (ref 3.8–10.5)
WBC # FLD AUTO: 10.91 K/UL — HIGH (ref 3.8–10.5)

## 2024-06-06 PROCEDURE — 99223 1ST HOSP IP/OBS HIGH 75: CPT

## 2024-06-06 PROCEDURE — 99233 SBSQ HOSP IP/OBS HIGH 50: CPT | Mod: GC

## 2024-06-06 PROCEDURE — 99222 1ST HOSP IP/OBS MODERATE 55: CPT | Mod: GC

## 2024-06-06 RX ORDER — ENOXAPARIN SODIUM 100 MG/ML
40 INJECTION SUBCUTANEOUS EVERY 24 HOURS
Refills: 0 | Status: DISCONTINUED | OUTPATIENT
Start: 2024-06-06 | End: 2024-06-13

## 2024-06-06 RX ORDER — HALOPERIDOL DECANOATE 100 MG/ML
2.5 INJECTION INTRAMUSCULAR EVERY 6 HOURS
Refills: 0 | Status: DISCONTINUED | OUTPATIENT
Start: 2024-06-06 | End: 2024-06-13

## 2024-06-06 RX ADMIN — GABAPENTIN 300 MILLIGRAM(S): 400 CAPSULE ORAL at 05:13

## 2024-06-06 RX ADMIN — OLANZAPINE 10 MILLIGRAM(S): 15 TABLET, FILM COATED ORAL at 21:43

## 2024-06-06 RX ADMIN — Medication 0.5 MILLIGRAM(S): at 19:21

## 2024-06-06 RX ADMIN — Medication 166.67 MILLIGRAM(S): at 05:09

## 2024-06-06 RX ADMIN — Medication 200 MILLIGRAM(S): at 05:11

## 2024-06-06 RX ADMIN — DIVALPROEX SODIUM 250 MILLIGRAM(S): 500 TABLET, DELAYED RELEASE ORAL at 05:07

## 2024-06-06 RX ADMIN — PIPERACILLIN AND TAZOBACTAM 25 GRAM(S): 4; .5 INJECTION, POWDER, LYOPHILIZED, FOR SOLUTION INTRAVENOUS at 07:14

## 2024-06-06 RX ADMIN — DIVALPROEX SODIUM 250 MILLIGRAM(S): 500 TABLET, DELAYED RELEASE ORAL at 13:39

## 2024-06-06 RX ADMIN — BUDESONIDE AND FORMOTEROL FUMARATE DIHYDRATE 2 PUFF(S): 160; 4.5 AEROSOL RESPIRATORY (INHALATION) at 21:43

## 2024-06-06 RX ADMIN — FINASTERIDE 5 MILLIGRAM(S): 5 TABLET, FILM COATED ORAL at 13:39

## 2024-06-06 RX ADMIN — SENNA PLUS 2 TABLET(S): 8.6 TABLET ORAL at 21:43

## 2024-06-06 RX ADMIN — Medication 4 MILLIGRAM(S): at 22:38

## 2024-06-06 RX ADMIN — DIVALPROEX SODIUM 250 MILLIGRAM(S): 500 TABLET, DELAYED RELEASE ORAL at 21:43

## 2024-06-06 RX ADMIN — GABAPENTIN 300 MILLIGRAM(S): 400 CAPSULE ORAL at 13:40

## 2024-06-06 RX ADMIN — PIPERACILLIN AND TAZOBACTAM 25 GRAM(S): 4; .5 INJECTION, POWDER, LYOPHILIZED, FOR SOLUTION INTRAVENOUS at 13:35

## 2024-06-06 RX ADMIN — GABAPENTIN 300 MILLIGRAM(S): 400 CAPSULE ORAL at 21:43

## 2024-06-06 RX ADMIN — PIPERACILLIN AND TAZOBACTAM 25 GRAM(S): 4; .5 INJECTION, POWDER, LYOPHILIZED, FOR SOLUTION INTRAVENOUS at 21:44

## 2024-06-06 RX ADMIN — Medication 0.5 MILLIGRAM(S): at 05:10

## 2024-06-06 NOTE — CONSULT NOTE ADULT - REASON FOR ADMISSION
Transferred from [facility] for OMFS consult,  osteomyelitis to right mandible with abscess

## 2024-06-06 NOTE — BH CONSULTATION LIAISON ASSESSMENT NOTE - HPI (INCLUDE ILLNESS QUALITY, SEVERITY, DURATION, TIMING, CONTEXT, MODIFYING FACTORS, ASSOCIATED SIGNS AND SYMPTOMS)
68-year-old male, smoker, ambulatory with a walker, history of schizophrenia (hallucinations with televisions), hypertension, COPD, BPH,  muscle weakness, spondylosis with myelopathy, mild pulmonary HTN and moderate tricuspid regurgitation (TTE 2022); Pt presents as transfer from [facility] for osteomyelitis of the right mandible with abscess.  Psych consulted for psychosis/agitation.     Chart reviewed. Per initial H&P: "Patient states 4 days ago woke up with right facial swelling that has progressively worsened and became tender. Reports no fever, difficulty or pain on swallowing, neck pain, recent falls or injuries. FYI: received morphine and clindamycin at outside hospital. OMFS c/s, then went to OR, now with drainage and IV ABX." Case discussed with primary team: possibly long term IV ABX, followed by OMFS.     On exam, pt is in bed, awake though oddly-related, mumbling/slurred due to surgery, with intense eye contact, concrete at times, asking for food, not knowing what happened. Exam/interview limited by his speech issues. Irritable, but not combative. Insight good regarding surgery but unclear if he could contextualize much more than that. Could not provide collateral contacts/names.

## 2024-06-06 NOTE — PROGRESS NOTE ADULT - PROBLEM SELECTOR PLAN 2
Suspect SIADH due to pain; Sodium 129    -Pain control as above  -Check TSH (added on)   -Monitor response to IV NS  -f/u urine lytes Suspect SIADH due to pain; Sodium 130     -Pain control as above  -S/p IVF  -urine lytes consistent with SIADH

## 2024-06-06 NOTE — BH CONSULTATION LIAISON ASSESSMENT NOTE - SUMMARY
68-year-old male, smoker, ambulatory with a walker, history of schizophrenia (hallucinations with televisions), hypertension, COPD, BPH,  muscle weakness, spondylosis with myelopathy, mild pulmonary HTN and moderate tricuspid regurgitation (TTE 2022); Pt presents as transfer from [facility] for osteomyelitis of the right mandible with abscess.  Psych consulted for psychosis/agitation. Exam limited by speech issues/recent oral surgery.   Will continue to follow.     PLAN:   - Routine observation  - Cannot leave AMA; lacks capacity to leave AMA or refuse any care.  - Identify surrogate decision makers/HCP/family if possible  - Continue current psych meds: Zyprexa 10mg HS, Cogentin 0.5mg BID  - CHANGE current two Depakote orders to: depakote 1000mg PO vs IV BID standing  - ADD Haldol 2.5 + Ativan 1mg IM/IV Q 6 PRN severe agitation  - Dispo: TBD, needs ongoing medical/surgical observation

## 2024-06-06 NOTE — PROGRESS NOTE ADULT - PROBLEM SELECTOR PLAN 3
No formal dx per the housing documentation, however, pt on typical COPD medications and active smoker    -c/w Symbicort   -Would start ppx Duoneb post OR   -Monitor pulse-ox closely No formal dx per the housing documentation, however, pt on typical COPD medications and active smoker    -c/w Symbicort

## 2024-06-06 NOTE — PROGRESS NOTE ADULT - PROBLEM SELECTOR PLAN 1
CT Neck: Extensive aggressive appearing bone erosion throughout the mandible. Swollen complex septated fluid appearance throughout the right  space involving both sides of the masseter muscle. Severe osteomyelitis with abscess in the right  space versus malignancy.    -leukocytosis to 13, afebrile  -Evaluated by OMFS, planing for I&D as an add-on today 6/5  -NPO  -Monitor for fever/ sepsis, obtain BCx if febrile  -VS q4h  -C/w Zosyn IV and vancomycin    -Pain control: Dilaudid IV PRN  -ID consulted, appreciate recs  -Senna CT Neck: Extensive aggressive appearing bone erosion throughout the mandible. Swollen complex septated fluid appearance throughout the right  space involving both sides of the masseter muscle. Severe osteomyelitis with abscess in the right  space versus malignancy.    -S/p I&D 6/5  -c/w CLD   -C/w Zosyn IV and vancomycin, pending trough   -Pain control: Dilaudid IV PRN --> 0.5 mg for moderate pain, 1 mg for severe pain   -ID consulted, appreciate recs  -Pending abscess cultures and speciation

## 2024-06-06 NOTE — PROGRESS NOTE ADULT - ASSESSMENT
67yo male, PMHx smoking, schizophrenia, HTN, COPD, BPH,  muscle weakness, spondylosis with myelopathy, mild pulmonary HTN and moderate tricuspid regurgitation (TTE 2022); Presents as transfer from [facility] a/w severe osteomyelitis of the right mandible with abscess with associated leukocytosis.

## 2024-06-06 NOTE — BH CONSULTATION LIAISON ASSESSMENT NOTE - NSICDXPASTMEDICALHX_GEN_ALL_CORE_FT
PAST MEDICAL HISTORY:  History of BPH     HTN (hypertension)     Muscle weakness     Schizophrenia

## 2024-06-06 NOTE — BH CONSULTATION LIAISON ASSESSMENT NOTE - NSBHCHARTREVIEWVS_PSY_A_CORE FT
Vital Signs Last 24 Hrs  T(C): 37.3 (06 Jun 2024 14:28), Max: 37.9 (05 Jun 2024 20:25)  T(F): 99.2 (06 Jun 2024 14:28), Max: 100.2 (05 Jun 2024 20:25)  HR: 72 (06 Jun 2024 14:28) (70 - 88)  BP: 108/66 (06 Jun 2024 14:28) (108/66 - 145/79)  BP(mean): 82 (06 Jun 2024 00:00) (78 - 93)  RR: 18 (06 Jun 2024 14:28) (14 - 23)  SpO2: 98% (06 Jun 2024 14:28) (93% - 100%)    Parameters below as of 06 Jun 2024 14:28  Patient On (Oxygen Delivery Method): room air

## 2024-06-06 NOTE — CONSULT NOTE ADULT - ATTENDING COMMENTS
67 yo man with schizophrenia transferred from [place] for OMFS evaluation  Right mandible osteomyelitis with abscess right  space   s/p I+D   penrose drain in place right submandibular area   cultures testing   path testing   suggestions above   will follow

## 2024-06-06 NOTE — PROGRESS NOTE ADULT - ATTENDING COMMENTS
yesterday w agitation requiring zyprexa  this am calm and cooperative  now post-op for abscess w drain - diet / drain per OMFS, for now on CLD  on vanco, zosyn - check MRSA PCR - ID to follow for bony involvement concerning for OM - f.u OR cx and path  psych input on psychotropics  mild hyponatremia - likely hypovolemic - trend for now   PT eval - might need OTILIA for abx

## 2024-06-06 NOTE — PROGRESS NOTE ADULT - ASSESSMENT
68M w/ PMH of HTN, Schizophrenia, presents to [facility] ED as transfer from [facility] w/ 3 day history of R sided facial swelling. CT Neck taken at outside hospital revealed R  space abscess w/ underlying osteomyelitis of mandible vs. malignancy, now POD1 s/p I&D of Right sided submasseteric, submandibular abscess, ectraction of #25-27, bone biopsy, 1x transcutaneous penrose drain on the Right neck. Pt progressing appropriately w/o acute events.    Plan:  -Trend wbc, culture  -CLD  -IV Abx  -multimodal pain control  -daily sterile gauze dressing change with skin tape  -rest of care per primary team    OMFS  #[id number]  Available on Teams

## 2024-06-06 NOTE — CONSULT NOTE ADULT - SUBJECTIVE AND OBJECTIVE BOX
Patient is a 68y old  Male who presents with a chief complaint of Transferred from [facility] for OMFS consult,  osteomyelitis to right mandible with abscess (06 Jun 2024 07:51)    HPI:  68-year-old male, smoker, ambulatory with a walker, history of schizophrenia (hallucinations with televisions), hypertension, COPD, BPH,  muscle weakness, spondylosis with myelopathy, mild pulmonary HTN and moderate tricuspid regurgitation (TTE 2022); Pt presents as transfer from [facility] for osteomyelitis of the right mandible with abscess. CT Neck taken at outside hospital revealed R  space abscess w/ underlying osteomyelitis of mandible vs. malignancy.   Patient states 4 days ago woke up with right facial swelling that has progressively worsened and became tender. Reports no fever, difficulty or pain on swallowing, neck pain, recent falls or injuries. Patient received morphine and clindamycin at outside hospital. OMFS c/s.    Underwent I&D of Right sided submasseteric, submandibular abscess, extraction of #25-27, bone biopsy, 1x transcutaneous penrose drain on the Right neck on 6/5.    Has been afebrile, WBC 13.4-->10.9    Abscess cultures collected    Blood cultures 6/4 at PB: NGTD    CT Neck with con at PB  Extensive aggressive appearing bone erosion throughout the mandible.  Swollen complex septated fluid appearance throughout the right  space involving both sides of the masseter muscle.  Severe osteomyelitis with abscess in the right  space versus malignancy.      prior hospital charts reviewed [  ]  primary team notes reviewed [  ]  other consultant notes reviewed [  ]    PAST MEDICAL & SURGICAL HISTORY:  Schizophrenia      HTN (hypertension)      History of BPH      Muscle weakness      No significant past surgical history          Allergies  Thorazine (Drowsiness)  penicillin (Rash)  Haldol (Drowsiness)    ANTIMICROBIALS (past 90 days)  MEDICATIONS  (STANDING):  clindamycin IVPB   100 mL/Hr IV Intermittent (06-05-24 @ 04:40)    piperacillin/tazobactam IVPB..   25 mL/Hr IV Intermittent (06-06-24 @ 07:14)   25 mL/Hr IV Intermittent (06-05-24 @ 23:11)   25 mL/Hr IV Intermittent (06-05-24 @ 10:03)    piperacillin/tazobactam IVPB...   200 mL/Hr IV Intermittent (06-05-24 @ 05:27)    vancomycin  IVPB   166.67 mL/Hr IV Intermittent (06-06-24 @ 05:09)   166.67 mL/Hr IV Intermittent (06-05-24 @ 19:14)    vancomycin  IVPB.   250 mL/Hr IV Intermittent (06-05-24 @ 06:08)        piperacillin/tazobactam IVPB.. 3.375 every 8 hours  vancomycin  IVPB 1250 every 12 hours    MEDICATIONS  (STANDING):  acetaminophen     Tablet .. 650 every 6 hours PRN  benztropine 0.5 every 12 hours  budesonide 160 MICROgram(s)/formoterol 4.5 MICROgram(s) Inhaler 2 two times a day  divalproex  every 8 hours  doxazosin 4 at bedtime  finasteride 5 daily  gabapentin 300 every 8 hours  HYDROmorphone  Injectable 1 every 6 hours PRN  HYDROmorphone  Injectable 0.5 every 3 hours PRN  labetalol 200 every 12 hours  melatonin 3 at bedtime PRN  OLANZapine 10 at bedtime  ondansetron Injectable 4 every 8 hours PRN  senna 2 at bedtime    SOCIAL HISTORY:       FAMILY HISTORY:  No pertinent family history in first degree relatives      REVIEW OF SYSTEMS  [  ] ROS unobtainable because:    [  ] All other systems negative except as noted below:	    Constitutional:  [ ] fever [ ] chills  [ ] weight loss  [ ] weakness  Skin:  [ ] rash [ ] phlebitis	  Eyes: [ ] icterus [ ] pain  [ ] discharge	  ENMT: [ ] sore throat  [ ] thrush [ ] ulcers [ ] exudates  Respiratory: [ ] dyspnea [ ] hemoptysis [ ] cough [ ] sputum	  Cardiovascular:  [ ] chest pain [ ] palpitations [ ] edema	  Gastrointestinal:  [ ] nausea [ ] vomiting [ ] diarrhea [ ] constipation [ ] pain	  Genitourinary:  [ ] dysuria [ ] frequency [ ] hematuria [ ] discharge [ ] flank pain  [ ] incontinence  Musculoskeletal:  [ ] myalgias [ ] arthralgias [ ] arthritis  [ ] back pain  Neurological:  [ ] headache [ ] seizures  [ ] confusion/altered mental status  Psychiatric:  [ ] anxiety [ ] depression	  Hematology/Lymphatics:  [ ] lymphadenopathy  Endocrine:  [ ] adrenal [ ] thyroid  Allergic/Immunologic:	 [ ] transplant [ ] seasonal    Vital Signs Last 24 Hrs  T(F): 97.5 (06-06-24 @ 06:42), Max: 100.2 (06-05-24 @ 20:25)  Vital Signs Last 24 Hrs  HR: 70 (06-06-24 @ 06:42) (70 - 88)  BP: 110/61 (06-06-24 @ 06:42) (110/61 - 145/79)  RR: 18 (06-06-24 @ 06:42)  SpO2: 100% (06-06-24 @ 06:42) (93% - 100%)  Wt(kg): --    PHYSICAL EXAM:                              9.6    10.91 )-----------( 274      ( 06 Jun 2024 06:00 )             27.7   06-06    130<L>  |  98  |  12  ----------------------------<  97  3.8   |  22  |  0.82    Ca    8.2<L>      06 Jun 2024 06:00  Phos  3.3     06-06  Mg     2.00     06-06    TPro  6.2  /  Alb  2.7<L>  /  TBili  0.4  /  DBili  x   /  AST  9   /  ALT  9   /  AlkPhos  65  06-06    Urinalysis Basic - ( 06 Jun 2024 06:00 )    Color: x / Appearance: x / SG: x / pH: x  Gluc: 97 mg/dL / Ketone: x  / Bili: x / Urobili: x   Blood: x / Protein: x / Nitrite: x   Leuk Esterase: x / RBC: x / WBC x   Sq Epi: x / Non Sq Epi: x / Bacteria: x    MICROBIOLOGY:              RADIOLOGY:  imaging below personally reviewed and agree with findings      ACC: [id number]     EXAM:  CT NECK SOFT TISSUE IC   ORDERED BY: ANTOINE ESPANA    PROCEDURE DATE:  06/04/2024        INTERPRETATION:  CLINICAL INFORMATION: R jaw edema, r/o parotitis vs abscess.    COMPARISON: None.    CONTRAST:  IV Contrast: Omnipaque 350  90 cc administered  10 cc discarded  Complications: None reported at time of study completion    TECHNIQUE:  Axial images were obtained through the neck after the administration of intravenous contrast material. Sagittal and coronal reformatted images were obtained.    FINDINGS:    AERODIGESTIVE TRACT:  Nasopharynx, oropharynx and hypopharynx are normal in appearance. Larynx and visualized trachea are normal. Visualized esophagus is normal,    LYMPH NODES:  No adenopathy.    PAROTID GLANDS:  Bilateral fatty replacement.    SUBMANDIBULAR GLANDS:  Normal.    THYROID GLAND:  Normal.    VASCULAR STRUCTURES: Major vessels are patent.    VISUALIZED PARANASAL SINUSES:  Clear.    VISUALIZED TYMPANOMASTOID CAVITIES: Clear.    BONES:  Extensive aggressive appearing bone erosion throughout the mandible. Extensive degenerative changes in the visualized spine.    VISUALIZED LUNGS: Clear.    MISCELLANEOUS:  Swollen complex septated fluid appearance throughout the right  space involving both sides of the masseter muscle.      IMPRESSION:  Extensive aggressive appearing bone erosion throughout the mandible.  Swollen complex septated fluid appearance throughout the right  space involving both sides of the masseter muscle.  Severe osteomyelitis with abscess in the right  space versus malignancy.           Patient is a 68y old  Male who presents with a chief complaint of Transferred from [facility] for OMFS consult,  osteomyelitis to right mandible with abscess (06 Jun 2024 07:51)    HPI:  68-year-old male, smoker, ambulatory with a walker, history of schizophrenia (hallucinations with televisions), hypertension, COPD, BPH,  muscle weakness, spondylosis with myelopathy, mild pulmonary HTN and moderate tricuspid regurgitation (TTE 2022); Pt presents as transfer from [facility] for osteomyelitis of the right mandible with abscess. CT Neck taken at outside hospital revealed R  space abscess w/ underlying osteomyelitis of mandible vs. malignancy.   Patient states 4 days ago woke up with right facial swelling that has progressively worsened and became tender. Reports no fever, difficulty or pain on swallowing, neck pain, recent falls or injuries. Patient received morphine and clindamycin at outside hospital. OMFS c/s.    Underwent I&D of Right sided submasseteric, submandibular abscess, extraction of #25-27, bone biopsy, 1x transcutaneous penrose drain on the Right neck on 6/5.    Has been afebrile, WBC 13.4-->10.9    Abscess cultures collected    Blood cultures 6/4 at PB: NGTD    CT Neck with con at [facility]  Extensive aggressive appearing bone erosion throughout the mandible.  Swollen complex septated fluid appearance throughout the right  space involving both sides of the masseter muscle.  Severe osteomyelitis with abscess in the right  space versus malignancy.      prior hospital charts reviewed [  ]  primary team notes reviewed [ x ]  other consultant notes reviewed [ x ]    PAST MEDICAL & SURGICAL HISTORY:  Schizophrenia      HTN (hypertension)      History of BPH      Muscle weakness      No significant past surgical history          Allergies  Thorazine (Drowsiness)  penicillin (Rash)  Haldol (Drowsiness)    ANTIMICROBIALS (past 90 days)  MEDICATIONS  (STANDING):  clindamycin IVPB   100 mL/Hr IV Intermittent (06-05-24 @ 04:40)    piperacillin/tazobactam IVPB..   25 mL/Hr IV Intermittent (06-06-24 @ 07:14)   25 mL/Hr IV Intermittent (06-05-24 @ 23:11)   25 mL/Hr IV Intermittent (06-05-24 @ 10:03)    piperacillin/tazobactam IVPB...   200 mL/Hr IV Intermittent (06-05-24 @ 05:27)    vancomycin  IVPB   166.67 mL/Hr IV Intermittent (06-06-24 @ 05:09)   166.67 mL/Hr IV Intermittent (06-05-24 @ 19:14)    vancomycin  IVPB.   250 mL/Hr IV Intermittent (06-05-24 @ 06:08)        piperacillin/tazobactam IVPB.. 3.375 every 8 hours  vancomycin  IVPB 1250 every 12 hours    MEDICATIONS  (STANDING):  acetaminophen     Tablet .. 650 every 6 hours PRN  benztropine 0.5 every 12 hours  budesonide 160 MICROgram(s)/formoterol 4.5 MICROgram(s) Inhaler 2 two times a day  divalproex  every 8 hours  doxazosin 4 at bedtime  finasteride 5 daily  gabapentin 300 every 8 hours  HYDROmorphone  Injectable 1 every 6 hours PRN  HYDROmorphone  Injectable 0.5 every 3 hours PRN  labetalol 200 every 12 hours  melatonin 3 at bedtime PRN  OLANZapine 10 at bedtime  ondansetron Injectable 4 every 8 hours PRN  senna 2 at bedtime    SOCIAL HISTORY: Lives in assisted community, no tobacco drug etoh use      FAMILY HISTORY:  No pertinent family history in first degree relatives      REVIEW OF SYSTEMS  [  ] ROS unobtainable because:    [ x ] All other systems negative except as noted below:	    Constitutional:  [ ] fever [x ] chills  [ ] weight loss  [ ] weakness  Skin:  [ ] rash [ ] phlebitis	  Eyes: [ ] icterus [ ] pain  [ ] discharge	  ENMT: [ ] sore throat  [ ] thrush [ ] ulcers [ ] exudates, facial swelling/pain+  Respiratory: [ ] dyspnea [ ] hemoptysis [ ] cough [ ] sputum	  Cardiovascular:  [ ] chest pain [ ] palpitations [ ] edema	  Gastrointestinal:  [ ] nausea [ ] vomiting [ ] diarrhea [ ] constipation [ ] pain	  Genitourinary:  [ ] dysuria [ ] frequency [ ] hematuria [ ] discharge [ ] flank pain  [ ] incontinence  Musculoskeletal:  [ ] myalgias [ ] arthralgias [ ] arthritis  [ ] back pain  Neurological:  [ ] headache [ ] seizures  [ ] confusion/altered mental status  Psychiatric:  [ ] anxiety [ ] depression	  Hematology/Lymphatics:  [ ] lymphadenopathy  Endocrine:  [ ] adrenal [ ] thyroid  Allergic/Immunologic:	 [ ] transplant [ ] seasonal    Vital Signs Last 24 Hrs  T(F): 97.5 (06-06-24 @ 06:42), Max: 100.2 (06-05-24 @ 20:25)  Vital Signs Last 24 Hrs  HR: 70 (06-06-24 @ 06:42) (70 - 88)  BP: 110/61 (06-06-24 @ 06:42) (110/61 - 145/79)  RR: 18 (06-06-24 @ 06:42)  SpO2: 100% (06-06-24 @ 06:42) (93% - 100%)  Wt(kg): --    PHYSICAL EXAM:    General: Patient in NAD  HEENT: NCAT, EOMI, PERRL, no oral lesions, Rt submandibular penrose drain in place  CV: S1+S2, no m/r/g appreciated   Lungs: No respiratory distress, CTAB  Abd: Soft, nontender, no guarding, no rebound tenderness, + bowel sounds   : No suprapubic tenderness  Neuro: Alert and oriented to time, place and person. Moves all extremities against gravity.  Ext: No cyanosis, no edema  Skin: No rash, no phlebitis                              9.6    10.91 )-----------( 274      ( 06 Jun 2024 06:00 )             27.7   06-06    130<L>  |  98  |  12  ----------------------------<  97  3.8   |  22  |  0.82    Ca    8.2<L>      06 Jun 2024 06:00  Phos  3.3     06-06  Mg     2.00     06-06    TPro  6.2  /  Alb  2.7<L>  /  TBili  0.4  /  DBili  x   /  AST  9   /  ALT  9   /  AlkPhos  65  06-06    Urinalysis Basic - ( 06 Jun 2024 06:00 )    Color: x / Appearance: x / SG: x / pH: x  Gluc: 97 mg/dL / Ketone: x  / Bili: x / Urobili: x   Blood: x / Protein: x / Nitrite: x   Leuk Esterase: x / RBC: x / WBC x   Sq Epi: x / Non Sq Epi: x / Bacteria: x    MICROBIOLOGY:              RADIOLOGY:  imaging below personally reviewed and agree with findings      ACC: [id number]     EXAM:  CT NECK SOFT TISSUE IC   ORDERED BY: ANTOINE ESPANA    PROCEDURE DATE:  06/04/2024        INTERPRETATION:  CLINICAL INFORMATION: R jaw edema, r/o parotitis vs abscess.    COMPARISON: None.    CONTRAST:  IV Contrast: Omnipaque 350  90 cc administered  10 cc discarded  Complications: None reported at time of study completion    TECHNIQUE:  Axial images were obtained through the neck after the administration of intravenous contrast material. Sagittal and coronal reformatted images were obtained.    FINDINGS:    AERODIGESTIVE TRACT:  Nasopharynx, oropharynx and hypopharynx are normal in appearance. Larynx and visualized trachea are normal. Visualized esophagus is normal,    LYMPH NODES:  No adenopathy.    PAROTID GLANDS:  Bilateral fatty replacement.    SUBMANDIBULAR GLANDS:  Normal.    THYROID GLAND:  Normal.    VASCULAR STRUCTURES: Major vessels are patent.    VISUALIZED PARANASAL SINUSES:  Clear.    VISUALIZED TYMPANOMASTOID CAVITIES: Clear.    BONES:  Extensive aggressive appearing bone erosion throughout the mandible. Extensive degenerative changes in the visualized spine.    VISUALIZED LUNGS: Clear.    MISCELLANEOUS:  Swollen complex septated fluid appearance throughout the right  space involving both sides of the masseter muscle.      IMPRESSION:  Extensive aggressive appearing bone erosion throughout the mandible.  Swollen complex septated fluid appearance throughout the right  space involving both sides of the masseter muscle.  Severe osteomyelitis with abscess in the right  space versus malignancy.

## 2024-06-06 NOTE — PROGRESS NOTE ADULT - SUBJECTIVE AND OBJECTIVE BOX
*******************************  Blanca Tucker MD (PGY-1)  Internal Medicine  Contact via [employer] TEAMS  *******************************    INTERVAL HPI/OVERNIGHT EVENTS:      OBJECTIVE  T(C): 36.4 (06-06-24 @ 06:42), Max: 37.9 (06-05-24 @ 20:25)  HR: 70 (06-06-24 @ 06:42) (70 - 88)  BP: 110/61 (06-06-24 @ 06:42) (110/61 - 145/79)  RR: 18 (06-06-24 @ 06:42) (14 - 23)  SpO2: 100% (06-06-24 @ 06:42) (93% - 100%)    06-05-24 @ 07:01  -  06-06-24 @ 07:00  --------------------------------------------------------  IN: 100 mL / OUT: 200 mL / NET: -100 mL        PHYSICAL EXAM  General:  HEENT:  Cardiovascular:  Pulmonary:  GI:  :  Neuro:  Psych:          IMAGING:     *******************************  Blanca Tucker MD (PGY-1)  Internal Medicine  Contact via [employer] TEAMS  *******************************    INTERVAL HPI/OVERNIGHT EVENTS:  S/p I&D with OMFS, currently appears comfortable.     OBJECTIVE  T(C): 36.4 (06-06-24 @ 06:42), Max: 37.9 (06-05-24 @ 20:25)  HR: 70 (06-06-24 @ 06:42) (70 - 88)  BP: 110/61 (06-06-24 @ 06:42) (110/61 - 145/79)  RR: 18 (06-06-24 @ 06:42) (14 - 23)  SpO2: 100% (06-06-24 @ 06:42) (93% - 100%)    06-05-24 @ 07:01  -  06-06-24 @ 07:00  --------------------------------------------------------  IN: 100 mL / OUT: 200 mL / NET: -100 mL        PHYSICAL EXAM  GENERAL: NAD, lying in bed comfortably  EYES: EOMI, PERRLA, conjunctiva and sclera clear  HEENT : +R. sided facial and submandibular edema with penrose drain in place, serosanguinous drainage   HEART: Regular rate and rhythm, no murmurs, rubs, or gallops  LUNGS: Unlabored respirations.   NERVOUS SYSTEM:  A&Ox3, moving all extremities, no focal deficits             IMAGING:

## 2024-06-06 NOTE — PROGRESS NOTE ADULT - SUBJECTIVE AND OBJECTIVE BOX
HPI:  68M w/ PMH of HTN, Schizophrenia, presents to [facility] ED as transfer from [facility] w/ 3 day history of R sided facial swelling. CT Neck taken at outside hospital revealed R  space abscess w/ underlying osteomyelitis of mandible vs. malignancy. On examination, pt w/ no acute complaints. Reports mild R sided facial pain. Otherwise, denies any fever, chills, difficulty breathing or swallowing or blurry vision. WBC 13.4     HD1, POD1  Patient now POD1 s/p I&D of Right sided submasseteric, submandibular abscess, ectraction of #25-27, bone biopsy, 1x transcutaneous penrose drain on the Right neck.    Examined at bedside this morning; AVSS, NAEON,     ICU Vital Signs Last 24 Hrs  T(C): 36.4 (06 Jun 2024 06:42), Max: 37.9 (05 Jun 2024 20:25)  T(F): 97.5 (06 Jun 2024 06:42), Max: 100.2 (05 Jun 2024 20:25)  HR: 70 (06 Jun 2024 06:42) (70 - 88)  BP: 110/61 (06 Jun 2024 06:42) (110/61 - 145/79)  BP(mean): 82 (06 Jun 2024 00:00) (78 - 93)  ABP: --  ABP(mean): --  RR: 18 (06 Jun 2024 06:42) (14 - 23)  SpO2: 100% (06 Jun 2024 06:42) (93% - 100%)    O2 Parameters below as of 06 Jun 2024 06:42  Patient On (Oxygen Delivery Method): room air        I&O's Detail    05 Jun 2024 07:01  -  06 Jun 2024 06:55  --------------------------------------------------------  IN:    IV PiggyBack: 25 mL    Lactated Ringers: 75 mL  Total IN: 100 mL    OUT:    Voided (mL): 200 mL  Total OUT: 200 mL    Total NET: -100 mL      MEDICATIONS  (STANDING):  benztropine 0.5 milliGRAM(s) Oral every 12 hours  budesonide 160 MICROgram(s)/formoterol 4.5 MICROgram(s) Inhaler 2 Puff(s) Inhalation two times a day  divalproex  milliGRAM(s) Oral every 8 hours  doxazosin 4 milliGRAM(s) Oral at bedtime  finasteride 5 milliGRAM(s) Oral daily  gabapentin 300 milliGRAM(s) Oral every 8 hours  labetalol 200 milliGRAM(s) Oral every 12 hours  OLANZapine 10 milliGRAM(s) Oral at bedtime  piperacillin/tazobactam IVPB.. 3.375 Gram(s) IV Intermittent every 8 hours  senna 2 Tablet(s) Oral at bedtime  sodium chloride 0.9%. 1000 milliLiter(s) (75 mL/Hr) IV Continuous <Continuous>  vancomycin  IVPB 1250 milliGRAM(s) IV Intermittent every 12 hours    MEDICATIONS  (PRN):  acetaminophen     Tablet .. 650 milliGRAM(s) Oral every 6 hours PRN Temp greater or equal to 38C (100.4F), Mild Pain (1 - 3)  HYDROmorphone  Injectable 1 milliGRAM(s) IV Push every 6 hours PRN Severe Pain (7 - 10)  HYDROmorphone  Injectable 0.5 milliGRAM(s) IV Push every 3 hours PRN Moderate Pain (4 - 6)  melatonin 3 milliGRAM(s) Oral at bedtime PRN Insomnia  ondansetron Injectable 4 milliGRAM(s) IV Push every 8 hours PRN Nausea and/or Vomiting    PE  Gen: NAD, sedated, arousable and follows command   HEEN: b/l neck w/ edema, b/l I&D sites dressed w/ sterile gauze and silk tape, expressing purulent drainage,   Maxillofacial: intraoral site w/ sutures c/i/h, FOM soft and non-raised b/l, uvula midline  Resp: unlabored resp on RA  Ext: wwp                          10.6   13.42 )-----------( 325      ( 05 Jun 2024 04:23 )             31.3   06-05    129<L>  |  93<L>  |  21  ----------------------------<  84  4.4   |  22  |  1.17    Ca    8.9      05 Jun 2024 04:23    TPro  6.7  /  Alb  2.9<L>  /  TBili  0.4  /  DBili  x   /  AST  8   /  ALT  9   /  AlkPhos  71  06-05

## 2024-06-06 NOTE — PROGRESS NOTE ADULT - PROBLEM SELECTOR PLAN 4
-Hold Amlodipine and Enalapril given high risk for spesis and pending OR intervention by OMFS   -VS q4h  -c/w Labetalol -Hold Amlodipine and Enalapril given high risk for sepsis and pending OR intervention by OMFS   -VS q4h  -c/w Labetalol 200 mg BID

## 2024-06-06 NOTE — BH CONSULTATION LIAISON ASSESSMENT NOTE - CURRENT MEDICATION
MEDICATIONS  (STANDING):  benztropine 0.5 milliGRAM(s) Oral every 12 hours  budesonide 160 MICROgram(s)/formoterol 4.5 MICROgram(s) Inhaler 2 Puff(s) Inhalation two times a day  divalproex  milliGRAM(s) Oral every 8 hours  doxazosin 4 milliGRAM(s) Oral at bedtime  finasteride 5 milliGRAM(s) Oral daily  gabapentin 300 milliGRAM(s) Oral every 8 hours  labetalol 200 milliGRAM(s) Oral every 12 hours  OLANZapine 10 milliGRAM(s) Oral at bedtime  piperacillin/tazobactam IVPB.. 3.375 Gram(s) IV Intermittent every 8 hours  senna 2 Tablet(s) Oral at bedtime  sodium chloride 0.9%. 1000 milliLiter(s) (75 mL/Hr) IV Continuous <Continuous>  vancomycin  IVPB 1250 milliGRAM(s) IV Intermittent every 12 hours    MEDICATIONS  (PRN):  acetaminophen     Tablet .. 650 milliGRAM(s) Oral every 6 hours PRN Temp greater or equal to 38C (100.4F), Mild Pain (1 - 3)  HYDROmorphone  Injectable 1 milliGRAM(s) IV Push every 6 hours PRN Severe Pain (7 - 10)  HYDROmorphone  Injectable 0.5 milliGRAM(s) IV Push every 3 hours PRN Moderate Pain (4 - 6)  melatonin 3 milliGRAM(s) Oral at bedtime PRN Insomnia  ondansetron Injectable 4 milliGRAM(s) IV Push every 8 hours PRN Nausea and/or Vomiting

## 2024-06-06 NOTE — CONSULT NOTE ADULT - ASSESSMENT
68-year-old male, smoker, ambulatory with a walker, history of schizophrenia (hallucinations with televisions), hypertension, COPD, BPH,  muscle weakness, spondylosis with myelopathy, mild pulmonary HTN and moderate tricuspid regurgitation (TTE 2022); Pt presents as transfer from [facility] for osteomyelitis of the right mandible with abscess. CT Neck taken at outside hospital revealed R  space abscess w/ underlying osteomyelitis of mandible vs. malignancy.   Patient states 4 days ago woke up with right facial swelling that has progressively worsened and became tender. Reports no fever, difficulty or pain on swallowing, neck pain, recent falls or injuries. Patient received morphine and clindamycin at outside hospital. OMFS c/s.    Underwent I&D of Right sided submasseteric, submandibular abscess, extraction of #25-27, bone biopsy, 1x transcutaneous penrose drain on the Right neck on 6/5.    Has been afebrile, WBC 13.4-->10.9    Abscess cultures collected    Blood cultures 6/4 at [facility]: NGTD    CT Neck with con at [facility]  Extensive aggressive appearing bone erosion throughout the mandible.  Swollen complex septated fluid appearance throughout the right  space involving both sides of the masseter muscle.  Severe osteomyelitis with abscess in the right  space versus malignancy.      # Right sided submasseteric, submandibular abscess, mandibular osteomyelitis s/p I&D  - Continue Zosyn [id number]  - Check MRSA PCR; will discuss stopping Vancomycin  - follow up abscess cultures/ bone biopsy  - can check baseline ESR/CRP      incomplete note      All recommendations are tentative pending Attending Attestation.    Geraldo Hernandez MD, PGY-4  ID Fellow  [employer] Teams Preferred  After 5pm/weekends call [phone]   68-year-old male, smoker, ambulatory with a walker, history of schizophrenia (hallucinations with televisions), hypertension, COPD, BPH,  muscle weakness, spondylosis with myelopathy, mild pulmonary HTN and moderate tricuspid regurgitation (TTE 2022); Pt presents as transfer from [facility] for osteomyelitis of the right mandible with abscess. CT Neck taken at outside hospital revealed R  space abscess w/ underlying osteomyelitis of mandible vs. malignancy.   Patient states 4 days ago woke up with right facial swelling that has progressively worsened and became tender. Reports no fever, difficulty or pain on swallowing, neck pain, recent falls or injuries. Patient received morphine and clindamycin at outside hospital. OMFS c/s.    Underwent I&D of Right sided submasseteric, submandibular abscess, extraction of #25-27, bone biopsy, 1x transcutaneous penrose drain on the Right neck on 6/5.    Has been afebrile, WBC 13.4-->10.9    Abscess cultures collected    Blood cultures 6/4 at [facility]: NGTD    CT Neck with con at [facility]  Extensive aggressive appearing bone erosion throughout the mandible.  Swollen complex septated fluid appearance throughout the right  space involving both sides of the masseter muscle.  Severe osteomyelitis with abscess in the right  space versus malignancy.      # Right sided submasseteric, submandibular abscess, mandibular osteomyelitis s/p I&D  - Continue Zosyn [id number]   - Check MRSA PCR; can stop Vancomycin if negative  - follow up abscess cultures/ bone biopsy  - can check baseline ESR/CRP      Case d/w Attending and Primary team.    Geraldo Hernandez MD, PGY-4  ID Fellow  [employer] Teams Preferred  After 5pm/weekends call [phone]

## 2024-06-06 NOTE — BH CONSULTATION LIAISON ASSESSMENT NOTE - MSE UNSTRUCTURED FT
On exam, pt is in bed, awake though oddly-related, mumbling/slurred due to surgery, with intense eye contact, concrete at times, asking for food, not knowing what happened. Exam/interview limited by his speech issues. Irritable, but not combative. Insight good regarding surgery but unclear if he could contextualize much more than that. Could not provide collateral contacts/names.

## 2024-06-07 LAB
ANION GAP SERPL CALC-SCNC: 11 MMOL/L — SIGNIFICANT CHANGE UP (ref 7–14)
BUN SERPL-MCNC: 7 MG/DL — SIGNIFICANT CHANGE UP (ref 7–23)
CALCIUM SERPL-MCNC: 8.5 MG/DL — SIGNIFICANT CHANGE UP (ref 8.4–10.5)
CHLORIDE SERPL-SCNC: 98 MMOL/L — SIGNIFICANT CHANGE UP (ref 98–107)
CO2 SERPL-SCNC: 23 MMOL/L — SIGNIFICANT CHANGE UP (ref 22–31)
CREAT SERPL-MCNC: 0.78 MG/DL — SIGNIFICANT CHANGE UP (ref 0.5–1.3)
CRP SERPL-MCNC: 115.1 MG/L — HIGH
EGFR: 97 ML/MIN/1.73M2 — SIGNIFICANT CHANGE UP
ERYTHROCYTE [SEDIMENTATION RATE] IN BLOOD: 75 MM/HR — HIGH (ref 1–15)
GLUCOSE SERPL-MCNC: 79 MG/DL — SIGNIFICANT CHANGE UP (ref 70–99)
HCT VFR BLD CALC: 32 % — LOW (ref 39–50)
HGB BLD-MCNC: 10.4 G/DL — LOW (ref 13–17)
MAGNESIUM SERPL-MCNC: 1.9 MG/DL — SIGNIFICANT CHANGE UP (ref 1.6–2.6)
MCHC RBC-ENTMCNC: 27.2 PG — SIGNIFICANT CHANGE UP (ref 27–34)
MCHC RBC-ENTMCNC: 32.5 GM/DL — SIGNIFICANT CHANGE UP (ref 32–36)
MCV RBC AUTO: 83.6 FL — SIGNIFICANT CHANGE UP (ref 80–100)
MRSA PCR RESULT.: SIGNIFICANT CHANGE UP
NRBC # BLD: 0 /100 WBCS — SIGNIFICANT CHANGE UP (ref 0–0)
NRBC # FLD: 0 K/UL — SIGNIFICANT CHANGE UP (ref 0–0)
PHOSPHATE SERPL-MCNC: 3.1 MG/DL — SIGNIFICANT CHANGE UP (ref 2.5–4.5)
PLATELET # BLD AUTO: 302 K/UL — SIGNIFICANT CHANGE UP (ref 150–400)
POTASSIUM SERPL-MCNC: 4 MMOL/L — SIGNIFICANT CHANGE UP (ref 3.5–5.3)
POTASSIUM SERPL-SCNC: 4 MMOL/L — SIGNIFICANT CHANGE UP (ref 3.5–5.3)
RBC # BLD: 3.83 M/UL — LOW (ref 4.2–5.8)
RBC # FLD: 14.1 % — SIGNIFICANT CHANGE UP (ref 10.3–14.5)
S AUREUS DNA NOSE QL NAA+PROBE: SIGNIFICANT CHANGE UP
SODIUM SERPL-SCNC: 132 MMOL/L — LOW (ref 135–145)
WBC # BLD: 8.68 K/UL — SIGNIFICANT CHANGE UP (ref 3.8–10.5)
WBC # FLD AUTO: 8.68 K/UL — SIGNIFICANT CHANGE UP (ref 3.8–10.5)

## 2024-06-07 PROCEDURE — 99232 SBSQ HOSP IP/OBS MODERATE 35: CPT | Mod: GC

## 2024-06-07 PROCEDURE — 99232 SBSQ HOSP IP/OBS MODERATE 35: CPT

## 2024-06-07 RX ADMIN — Medication 200 MILLIGRAM(S): at 05:36

## 2024-06-07 RX ADMIN — OLANZAPINE 10 MILLIGRAM(S): 15 TABLET, FILM COATED ORAL at 21:27

## 2024-06-07 RX ADMIN — SENNA PLUS 2 TABLET(S): 8.6 TABLET ORAL at 21:26

## 2024-06-07 RX ADMIN — BUDESONIDE AND FORMOTEROL FUMARATE DIHYDRATE 2 PUFF(S): 160; 4.5 AEROSOL RESPIRATORY (INHALATION) at 08:57

## 2024-06-07 RX ADMIN — DIVALPROEX SODIUM 250 MILLIGRAM(S): 500 TABLET, DELAYED RELEASE ORAL at 05:36

## 2024-06-07 RX ADMIN — GABAPENTIN 300 MILLIGRAM(S): 400 CAPSULE ORAL at 05:36

## 2024-06-07 RX ADMIN — BUDESONIDE AND FORMOTEROL FUMARATE DIHYDRATE 2 PUFF(S): 160; 4.5 AEROSOL RESPIRATORY (INHALATION) at 21:26

## 2024-06-07 RX ADMIN — Medication 4 MILLIGRAM(S): at 21:26

## 2024-06-07 RX ADMIN — PIPERACILLIN AND TAZOBACTAM 25 GRAM(S): 4; .5 INJECTION, POWDER, LYOPHILIZED, FOR SOLUTION INTRAVENOUS at 05:36

## 2024-06-07 RX ADMIN — PIPERACILLIN AND TAZOBACTAM 25 GRAM(S): 4; .5 INJECTION, POWDER, LYOPHILIZED, FOR SOLUTION INTRAVENOUS at 21:26

## 2024-06-07 RX ADMIN — Medication 200 MILLIGRAM(S): at 17:16

## 2024-06-07 RX ADMIN — Medication 0.5 MILLIGRAM(S): at 06:28

## 2024-06-07 RX ADMIN — Medication 0.5 MILLIGRAM(S): at 17:15

## 2024-06-07 RX ADMIN — PIPERACILLIN AND TAZOBACTAM 25 GRAM(S): 4; .5 INJECTION, POWDER, LYOPHILIZED, FOR SOLUTION INTRAVENOUS at 11:29

## 2024-06-07 RX ADMIN — FINASTERIDE 5 MILLIGRAM(S): 5 TABLET, FILM COATED ORAL at 11:28

## 2024-06-07 RX ADMIN — GABAPENTIN 300 MILLIGRAM(S): 400 CAPSULE ORAL at 11:30

## 2024-06-07 RX ADMIN — DIVALPROEX SODIUM 250 MILLIGRAM(S): 500 TABLET, DELAYED RELEASE ORAL at 21:26

## 2024-06-07 RX ADMIN — DIVALPROEX SODIUM 250 MILLIGRAM(S): 500 TABLET, DELAYED RELEASE ORAL at 11:28

## 2024-06-07 RX ADMIN — GABAPENTIN 300 MILLIGRAM(S): 400 CAPSULE ORAL at 21:31

## 2024-06-07 NOTE — BH CONSULTATION LIAISON PROGRESS NOTE - CURRENT MEDICATION
MEDICATIONS  (STANDING):  benztropine 0.5 milliGRAM(s) Oral every 12 hours  budesonide 160 MICROgram(s)/formoterol 4.5 MICROgram(s) Inhaler 2 Puff(s) Inhalation two times a day  divalproex  milliGRAM(s) Oral every 8 hours  doxazosin 4 milliGRAM(s) Oral at bedtime  enoxaparin Injectable 40 milliGRAM(s) SubCutaneous every 24 hours  finasteride 5 milliGRAM(s) Oral daily  gabapentin 300 milliGRAM(s) Oral every 8 hours  labetalol 200 milliGRAM(s) Oral every 12 hours  OLANZapine 10 milliGRAM(s) Oral at bedtime  piperacillin/tazobactam IVPB.. 3.375 Gram(s) IV Intermittent every 8 hours  senna 2 Tablet(s) Oral at bedtime  sodium chloride 0.9%. 1000 milliLiter(s) (75 mL/Hr) IV Continuous <Continuous>    MEDICATIONS  (PRN):  acetaminophen     Tablet .. 650 milliGRAM(s) Oral every 6 hours PRN Temp greater or equal to 38C (100.4F), Mild Pain (1 - 3)  haloperidol    Injectable 2.5 milliGRAM(s) IntraMuscular every 6 hours PRN severe agitation  LORazepam   Injectable 1 milliGRAM(s) IV Push every 6 hours PRN severe agitation  melatonin 3 milliGRAM(s) Oral at bedtime PRN Insomnia  ondansetron Injectable 4 milliGRAM(s) IV Push every 8 hours PRN Nausea and/or Vomiting

## 2024-06-07 NOTE — PROGRESS NOTE ADULT - ASSESSMENT
68-year-old male, smoker, ambulatory with a walker, history of schizophrenia (hallucinations with televisions), hypertension, COPD, BPH,  muscle weakness, spondylosis with myelopathy, mild pulmonary HTN and moderate tricuspid regurgitation (TTE 2022); Pt presents as transfer from [facility] for osteomyelitis of the right mandible with abscess. CT Neck taken at outside hospital revealed R  space abscess w/ underlying osteomyelitis of mandible vs. malignancy.   Patient states 4 days ago woke up with right facial swelling that has progressively worsened and became tender. Reports no fever, difficulty or pain on swallowing, neck pain, recent falls or injuries. Patient received morphine and clindamycin at outside hospital. OMFS c/s.    Underwent I&D of Right sided submasseteric, submandibular abscess, extraction of #25-27, bone biopsy, 1x transcutaneous penrose drain on the Right neck on 6/5.    Has been afebrile, WBC 13.4-->10.9  -->  8     Abscess cultures no growth to date     Blood cultures 6/4 at [facility]: NGTD    CT Neck with con at [facility]  Extensive aggressive appearing bone erosion throughout the mandible.  Swollen complex septated fluid appearance throughout the right  space involving both sides of the masseter muscle.  Severe osteomyelitis with abscess in the right  space versus malignancy.      # Right sided submasseteric, submandibular abscess, mandibular osteomyelitis s/p I&D, bone bx, teeth extraction       - improving  less swelling   afebrile     - Continue Zosyn [id number]     - Check MRSA PCR; can stop Vancomycin if negative    - follow up abscess cultures/ bone biopsy    - can check baseline ESR/CRP      ID service available over weekend

## 2024-06-07 NOTE — PROGRESS NOTE ADULT - SUBJECTIVE AND OBJECTIVE BOX
OMFS Progress note     HPI:  68M w/ PMH of HTN, Schizophrenia, presents to [facility] ED as transfer from [facility] w/ 3 day history of R sided facial swelling. CT Neck taken at outside hospital revealed R  space abscess w/ underlying osteomyelitis of mandible vs. malignancy. On examination, pt w/ no acute complaints. Reports mild R sided facial pain. Otherwise, denies any fever, chills, difficulty breathing or swallowing or blurry vision. WBC 13.4       Now POD2 s/p I&D of Right sided submasseteric, submandibular abscess, extraction of #25-27, bone biopsy, 1x transcutaneous penrose drain on the Right neck.    Interval/ Subjective: NAEON. Reports feeling better this AM.     ICU Vital Signs Last 24 Hrs  T(C): 37 (07 Jun 2024 04:51), Max: 37.5 (06 Jun 2024 20:15)  T(F): 98.6 (07 Jun 2024 04:51), Max: 99.5 (06 Jun 2024 20:15)  HR: 68 (07 Jun 2024 04:51) (68 - 87)  BP: 113/62 (07 Jun 2024 04:51) (105/64 - 120/72)  BP(mean): --  ABP: --  ABP(mean): --  RR: 18 (07 Jun 2024 04:51) (17 - 18)  SpO2: 96% (07 Jun 2024 04:51) (96% - 100%)    O2 Parameters below as of 07 Jun 2024 04:51  Patient On (Oxygen Delivery Method): room air      I&O's Detail    05 Jun 2024 07:01  -  06 Jun 2024 06:55  --------------------------------------------------------  IN:    IV PiggyBack: 25 mL    Lactated Ringers: 75 mL  Total IN: 100 mL    OUT:    Voided (mL): 200 mL  Total OUT: 200 mL    Total NET: -100 mL      MEDICATIONS  (STANDING):  benztropine 0.5 milliGRAM(s) Oral every 12 hours  budesonide 160 MICROgram(s)/formoterol 4.5 MICROgram(s) Inhaler 2 Puff(s) Inhalation two times a day  divalproex  milliGRAM(s) Oral every 8 hours  doxazosin 4 milliGRAM(s) Oral at bedtime  finasteride 5 milliGRAM(s) Oral daily  gabapentin 300 milliGRAM(s) Oral every 8 hours  labetalol 200 milliGRAM(s) Oral every 12 hours  OLANZapine 10 milliGRAM(s) Oral at bedtime  piperacillin/tazobactam IVPB.. 3.375 Gram(s) IV Intermittent every 8 hours  senna 2 Tablet(s) Oral at bedtime  sodium chloride 0.9%. 1000 milliLiter(s) (75 mL/Hr) IV Continuous <Continuous>  vancomycin  IVPB 1250 milliGRAM(s) IV Intermittent every 12 hours    MEDICATIONS  (PRN):  acetaminophen     Tablet .. 650 milliGRAM(s) Oral every 6 hours PRN Temp greater or equal to 38C (100.4F), Mild Pain (1 - 3)  HYDROmorphone  Injectable 1 milliGRAM(s) IV Push every 6 hours PRN Severe Pain (7 - 10)  HYDROmorphone  Injectable 0.5 milliGRAM(s) IV Push every 3 hours PRN Moderate Pain (4 - 6)  melatonin 3 milliGRAM(s) Oral at bedtime PRN Insomnia  ondansetron Injectable 4 milliGRAM(s) IV Push every 8 hours PRN Nausea and/or Vomiting    PE  Gen: NAD, AAOx3, in better spirits this morning  HEEN: R neck edema improving. Serosanguinous drainage on palpation. Overlying gauze dressing with minimally saturated with purulence.    Maxillofacial: intraoral site w/ sutures c/i/h, FOM soft and non-raised b/l, uvula midline  Resp: unlabored resp on RA  Ext: wwp                          10.6   13.42 )-----------( 325      ( 05 Jun 2024 04:23 )             31.3   06-05    129<L>  |  93<L>  |  21  ----------------------------<  84  4.4   |  22  |  1.17    Ca    8.9      05 Jun 2024 04:23    TPro  6.7  /  Alb  2.9<L>  /  TBili  0.4  /  DBili  x   /  AST  8   /  ALT  9   /  AlkPhos  71  06-05

## 2024-06-07 NOTE — PROGRESS NOTE ADULT - SUBJECTIVE AND OBJECTIVE BOX
Follow Up:      Interval History/ROS:   seems more comfortable today       Allergies  Thorazine (Drowsiness)  penicillin (Rash)  Haldol (Drowsiness)        ANTIMICROBIALS:  piperacillin/tazobactam IVPB.. 3.375 every 8 hours      OTHER MEDS:  acetaminophen     Tablet .. 650 milliGRAM(s) Oral every 6 hours PRN  benztropine 0.5 milliGRAM(s) Oral every 12 hours  budesonide 160 MICROgram(s)/formoterol 4.5 MICROgram(s) Inhaler 2 Puff(s) Inhalation two times a day  divalproex  milliGRAM(s) Oral every 8 hours  doxazosin 4 milliGRAM(s) Oral at bedtime  enoxaparin Injectable 40 milliGRAM(s) SubCutaneous every 24 hours  finasteride 5 milliGRAM(s) Oral daily  gabapentin 300 milliGRAM(s) Oral every 8 hours  haloperidol    Injectable 2.5 milliGRAM(s) IntraMuscular every 6 hours PRN  labetalol 200 milliGRAM(s) Oral every 12 hours  LORazepam   Injectable 1 milliGRAM(s) IV Push every 6 hours PRN  melatonin 3 milliGRAM(s) Oral at bedtime PRN  OLANZapine 10 milliGRAM(s) Oral at bedtime  ondansetron Injectable 4 milliGRAM(s) IV Push every 8 hours PRN  senna 2 Tablet(s) Oral at bedtime  sodium chloride 0.9%. 1000 milliLiter(s) IV Continuous <Continuous>      Vital Signs Last 24 Hrs  T(C): 36.5 (07 Jun 2024 11:35), Max: 37.5 (06 Jun 2024 20:15)  T(F): 97.7 (07 Jun 2024 11:35), Max: 99.5 (06 Jun 2024 20:15)  HR: 63 (07 Jun 2024 11:35) (63 - 87)  BP: 107/75 (07 Jun 2024 11:35) (105/64 - 120/72)  BP(mean): --  RR: 18 (07 Jun 2024 11:35) (17 - 18)  SpO2: 100% (07 Jun 2024 11:35) (96% - 100%)    Parameters below as of 07 Jun 2024 11:35  Patient On (Oxygen Delivery Method): room air        PHYSICAL EXAM:  Constitutional: Not in acute distress  Eyes: No icterus.  Neck: dressing right neck  face, neck less swelling   RS:  no respiratory distress   CVS: S1, S2   Abdomen: Soft. No guarding/rigidity/tenderness.  Vascular: no iv access now   Neuro: Alert, oriented x 1  Cranial nerves 2-12 grossly normal. No focal abnormalities                          10.4   8.68  )-----------( 302      ( 07 Jun 2024 06:00 )             32.0       06-07    132<L>  |  98  |  7   ----------------------------<  79  4.0   |  23  |  0.78    Ca    8.5      07 Jun 2024 06:00  Phos  3.1     06-07  Mg     1.90     06-07    TPro  6.2  /  Alb  2.7<L>  /  TBili  0.4  /  DBili  x   /  AST  9   /  ALT  9   /  AlkPhos  65  06-06      Urinalysis Basic - ( 07 Jun 2024 06:00 )    Color: x / Appearance: x / SG: x / pH: x  Gluc: 79 mg/dL / Ketone: x  / Bili: x / Urobili: x   Blood: x / Protein: x / Nitrite: x   Leuk Esterase: x / RBC: x / WBC x   Sq Epi: x / Non Sq Epi: x / Bacteria: x        MICROBIOLOGY:  v  .Abscess ABS RIGHT NECK 1  06-05-24   Testing in progress  --    Moderate polymorphonuclear leukocytes per low power field  No organisms seen per oil power field                RADIOLOGY:

## 2024-06-07 NOTE — PROGRESS NOTE ADULT - ASSESSMENT
69yo male, PMHx smoking, schizophrenia, HTN, COPD, BPH,  muscle weakness, spondylosis with myelopathy, mild pulmonary HTN and moderate tricuspid regurgitation (TTE 2022); Presents as transfer from [facility] a/w severe osteomyelitis of the right mandible with abscess with associated leukocytosis.

## 2024-06-07 NOTE — BH CONSULTATION LIAISON PROGRESS NOTE - NSBHCHARTREVIEWVS_PSY_A_CORE FT
Vital Signs Last 24 Hrs  T(C): 36.9 (07 Jun 2024 07:40), Max: 37.5 (06 Jun 2024 20:15)  T(F): 98.5 (07 Jun 2024 07:40), Max: 99.5 (06 Jun 2024 20:15)  HR: 75 (07 Jun 2024 07:40) (68 - 87)  BP: 110/65 (07 Jun 2024 07:40) (105/64 - 120/72)  BP(mean): --  RR: 18 (07 Jun 2024 07:40) (17 - 18)  SpO2: 98% (07 Jun 2024 07:40) (96% - 100%)    Parameters below as of 07 Jun 2024 07:40  Patient On (Oxygen Delivery Method): room air

## 2024-06-07 NOTE — BH CONSULTATION LIAISON PROGRESS NOTE - NSBHFUPINTERVALHXFT_PSY_A_CORE
Chart reviewed. Discussed in IDRs. No PRNs overnight. On exam, is AA, improved speech, anxious, asking to eat and leave but rather concrete. Understands he needs medical care. Not combative/agitated. Denies SI, HI, AVH. Some mild paranoia/fear of nurses/staff. Insight fair at best

## 2024-06-07 NOTE — PROGRESS NOTE ADULT - PROBLEM SELECTOR PLAN 2
Suspect SIADH due to pain; Sodium 130     -Pain control as above  -S/p IVF  -urine lytes consistent with SIADH Improving  Suspect SIADH due to pain

## 2024-06-07 NOTE — PROGRESS NOTE ADULT - PROBLEM SELECTOR PLAN 5
On Prolixin Decanoate [id number]     - c/w Zyprexodalys Depakote On Prolixin Decanoate [id number]   - cannot leave AMA, lacks capacity to leave or refuse care   - c/w Zyprexa 10 mg qhs, Depakote 250mg q8H, cogentin 0.5 mg BID  - haldol 2.5 mg and ativan 1mg IV/IM q6H PRN for severe agitation   - psych following, appreciate recs

## 2024-06-07 NOTE — BH CONSULTATION LIAISON PROGRESS NOTE - MSE UNSTRUCTURED FT
On exam, is AA, improved speech, anxious, asking to eat and leave but rather concrete. Understands he needs medical care. Not combative/agitated. Denies SI, HI, AVH. Some mild paranoia/fear of nurses/staff. Insight fair at best

## 2024-06-07 NOTE — PROGRESS NOTE ADULT - PROBLEM SELECTOR PLAN 1
CT Neck: Extensive aggressive appearing bone erosion throughout the mandible. Swollen complex septated fluid appearance throughout the right  space involving both sides of the masseter muscle. Severe osteomyelitis with abscess in the right  space versus malignancy.    -S/p I&D 6/5  -c/w CLD   -C/w Zosyn IV and vancomycin, pending trough   -Pain control: Dilaudid IV PRN --> 0.5 mg for moderate pain, 1 mg for severe pain   -ID consulted, appreciate recs  -Pending abscess cultures and speciation CT Neck: Extensive aggressive appearing bone erosion throughout the mandible. Swollen complex septated fluid appearance throughout the right  space involving both sides of the masseter muscle. Severe osteomyelitis with abscess in the right  space versus malignancy.    -S/p I&D 6/5  -c/w CLD   -C/w Zosyn IV (6/5 - ). Vancomycin discontinued per ID recs  -Pain control: Dilaudid IV PRN --> 0.5 mg for moderate pain, 1 mg for severe pain   -ID consulted, appreciate recs  -Pending abscess cultures and speciation  [] f/u MRSA swab

## 2024-06-07 NOTE — PROGRESS NOTE ADULT - SUBJECTIVE AND OBJECTIVE BOX
*******************************  Blanca Tucker MD (PGY-1)  Internal Medicine  Contact via [employer] TEAMS  *******************************    INTERVAL HPI/OVERNIGHT EVENTS:      OBJECTIVE  T(C): 37 (06-07-24 @ 04:51), Max: 37.5 (06-06-24 @ 20:15)  HR: 68 (06-07-24 @ 04:51) (68 - 87)  BP: 113/62 (06-07-24 @ 04:51) (105/64 - 120/72)  RR: 18 (06-07-24 @ 04:51) (17 - 18)  SpO2: 96% (06-07-24 @ 04:51) (96% - 100%)      PHYSICAL EXAM  General:  HEENT:  Cardiovascular:  Pulmonary:  GI:  :  Neuro:  Psych:        Culture - Abscess with Gram Stain (collected 06-05-24 @ 21:20)  Source: .Abscess right neck abscess  Gram Stain (06-06-24 @ 14:02):    Moderate polymorphonuclear leukocytes per low power field    No organisms seen per oil power field    Culture - Abscess with Gram Stain (collected 06-05-24 @ 21:20)  Source: .Abscess ABS RIGHT NECK 1  Gram Stain (06-06-24 @ 14:04):    Moderate polymorphonuclear leukocytes per low power field    No organisms seen per oil power field        IMAGING:     *******************************  Blanca Tucker MD (PGY-1)  Internal Medicine  Contact via [employer] TEAMS  *******************************    INTERVAL HPI/OVERNIGHT EVENTS:  No acute overnight events.     OBJECTIVE  T(C): 37 (06-07-24 @ 04:51), Max: 37.5 (06-06-24 @ 20:15)  HR: 68 (06-07-24 @ 04:51) (68 - 87)  BP: 113/62 (06-07-24 @ 04:51) (105/64 - 120/72)  RR: 18 (06-07-24 @ 04:51) (17 - 18)  SpO2: 96% (06-07-24 @ 04:51) (96% - 100%)      PHYSICAL EXAM  GENERAL: NAD, lying in bed comfortably  EYES: EOMI, PERRLA, conjunctiva and sclera clear  HEENT : +R. sided facial and submandibular edema with penrose drain in place w/ overlying gauze   HEART: Regular rate and rhythm, no murmurs, rubs, or gallops  LUNGS: Unlabored respirations.   NERVOUS SYSTEM:  A&Ox1-2, moving all extremities, no focal deficits   PSYCH: Lacks capacity and insight, occasionally aggressive and agitated        Culture - Abscess with Gram Stain (collected 06-05-24 @ 21:20)  Source: .Abscess right neck abscess  Gram Stain (06-06-24 @ 14:02):    Moderate polymorphonuclear leukocytes per low power field    No organisms seen per oil power field    Culture - Abscess with Gram Stain (collected 06-05-24 @ 21:20)  Source: .Abscess ABS RIGHT NECK 1  Gram Stain (06-06-24 @ 14:04):    Moderate polymorphonuclear leukocytes per low power field    No organisms seen per oil power field        IMAGING:

## 2024-06-07 NOTE — PROGRESS NOTE ADULT - ASSESSMENT
68M w/ PMH of HTN, Schizophrenia, presents to [facility] ED as transfer from [facility] w/ 3 day history of R sided facial swelling. CT Neck taken at outside hospital revealed R  space abscess w/ underlying osteomyelitis of mandible vs. malignancy, now POD2 s/p I&D of Right sided submasseteric, submandibular abscess, ectraction of #25-27, bone biopsy, 1x transcutaneous penrose drain on the Right neck is progressing well     Plan:  -Trend wbc  -cultures, bone bx pending   -advance to full liquid diet   -appreciate ID recs   -multimodal pain control  -daily sterile gauze dressing change with skin tape  -rest of care per primary team    OMFS  #[id number]  Available on Teams

## 2024-06-07 NOTE — PROGRESS NOTE ADULT - PROBLEM SELECTOR PLAN 4
-Hold Amlodipine and Enalapril given high risk for sepsis and pending OR intervention by OMFS   -VS q4h  -c/w Labetalol 200 mg BID

## 2024-06-07 NOTE — BH CONSULTATION LIAISON PROGRESS NOTE - NSBHASSESSMENTFT_PSY_ALL_CORE
68-year-old male, smoker, ambulatory with a walker, history of schizophrenia (hallucinations with televisions), hypertension, COPD, BPH,  muscle weakness, spondylosis with myelopathy, mild pulmonary HTN and moderate tricuspid regurgitation (TTE 2022); Pt presents as transfer [facility] for osteomyelitis of the right mandible with abscess.  Psych consulted for psychosis/agitation. Exam limited by speech issues/recent oral surgery.   Will continue to follow.     6/7: AA, concrete, but cooperative. No PRNs. Complying with care.    PLAN:   - Routine observation  - Cannot leave AMA; lacks capacity to leave AMA or refuse any care.  - Identify surrogate decision makers/HCP/family if possible  - Continue current psych meds: Zyprexa 10mg HS, Cogentin 0.5mg BID, depakote 250mg PO Q8  - C/W Haldol 2.5 + Ativan 1mg IM/IV Q 6 PRN severe agitation  - Dispo: TBD, likely rehab for long term IVABX, needs ongoing medical/surgical observation. No current role for inpt psych

## 2024-06-07 NOTE — PROGRESS NOTE ADULT - PROBLEM SELECTOR PLAN 3
No formal dx per the housing documentation, however, pt on typical COPD medications and active smoker    -c/w Symbicort

## 2024-06-07 NOTE — PROGRESS NOTE ADULT - ATTENDING COMMENTS
diet and drain per OMFS - advanced to full liquid today  amy ID - can dc vanco, will f.u MRSA PCR - cw zosyn   inflammatory markers elevated, CT w bony erosions, OR cx and path pending - anticipate long course IV abx considering concern for OM  amy psych - psychotropics per psych, lacks capacity, cannot AMA - no inpt needs - monitor qtc on psychotropics  hyponatremia resolving - suspect euvolemic - diet being advanced  PT eval to help facilitate dispo if needs long term abx can complete course in rehab   for now unable to identify any family/surrogate     dispo - pending abx recs / drain diet and drain per OMFS - advanced to full liquid today  amy ID - can dc vanco, will f.u MRSA PCR - cw zosyn   inflammatory markers elevated, CT w bony erosions, OR cx and path pending - anticipate long course IV abx considering concern for OM  amy psych - psychotropics per psych, lacks capacity, cannot AMA - no inpt needs - monitor qtc on psychotropics  hyponatremia resolving - suspect hypovolemic hyponatremia - diet being advanced  PT eval to help facilitate dispo if needs long term abx can complete course in rehab   for now unable to identify any family/surrogate     dispo - pending abx recs / drain

## 2024-06-08 LAB
ANION GAP SERPL CALC-SCNC: 11 MMOL/L — SIGNIFICANT CHANGE UP (ref 7–14)
BUN SERPL-MCNC: 5 MG/DL — LOW (ref 7–23)
CALCIUM SERPL-MCNC: 8.4 MG/DL — SIGNIFICANT CHANGE UP (ref 8.4–10.5)
CHLORIDE SERPL-SCNC: 98 MMOL/L — SIGNIFICANT CHANGE UP (ref 98–107)
CO2 SERPL-SCNC: 22 MMOL/L — SIGNIFICANT CHANGE UP (ref 22–31)
CREAT SERPL-MCNC: 0.8 MG/DL — SIGNIFICANT CHANGE UP (ref 0.5–1.3)
EGFR: 96 ML/MIN/1.73M2 — SIGNIFICANT CHANGE UP
GLUCOSE SERPL-MCNC: 79 MG/DL — SIGNIFICANT CHANGE UP (ref 70–99)
GRAM STN FLD: ABNORMAL
HCT VFR BLD CALC: 31.3 % — LOW (ref 39–50)
HGB BLD-MCNC: 10.5 G/DL — LOW (ref 13–17)
MAGNESIUM SERPL-MCNC: 1.9 MG/DL — SIGNIFICANT CHANGE UP (ref 1.6–2.6)
MCHC RBC-ENTMCNC: 27.8 PG — SIGNIFICANT CHANGE UP (ref 27–34)
MCHC RBC-ENTMCNC: 33.5 GM/DL — SIGNIFICANT CHANGE UP (ref 32–36)
MCV RBC AUTO: 82.8 FL — SIGNIFICANT CHANGE UP (ref 80–100)
NRBC # BLD: 0 /100 WBCS — SIGNIFICANT CHANGE UP (ref 0–0)
NRBC # FLD: 0 K/UL — SIGNIFICANT CHANGE UP (ref 0–0)
PHOSPHATE SERPL-MCNC: 3.3 MG/DL — SIGNIFICANT CHANGE UP (ref 2.5–4.5)
PLATELET # BLD AUTO: 294 K/UL — SIGNIFICANT CHANGE UP (ref 150–400)
POTASSIUM SERPL-MCNC: 4.2 MMOL/L — SIGNIFICANT CHANGE UP (ref 3.5–5.3)
POTASSIUM SERPL-SCNC: 4.2 MMOL/L — SIGNIFICANT CHANGE UP (ref 3.5–5.3)
RBC # BLD: 3.78 M/UL — LOW (ref 4.2–5.8)
RBC # FLD: 14.2 % — SIGNIFICANT CHANGE UP (ref 10.3–14.5)
SODIUM SERPL-SCNC: 131 MMOL/L — LOW (ref 135–145)
WBC # BLD: 7.72 K/UL — SIGNIFICANT CHANGE UP (ref 3.8–10.5)
WBC # FLD AUTO: 7.72 K/UL — SIGNIFICANT CHANGE UP (ref 3.8–10.5)

## 2024-06-08 PROCEDURE — 99232 SBSQ HOSP IP/OBS MODERATE 35: CPT | Mod: GC

## 2024-06-08 RX ADMIN — FINASTERIDE 5 MILLIGRAM(S): 5 TABLET, FILM COATED ORAL at 11:31

## 2024-06-08 RX ADMIN — GABAPENTIN 300 MILLIGRAM(S): 400 CAPSULE ORAL at 11:34

## 2024-06-08 RX ADMIN — Medication 0.5 MILLIGRAM(S): at 17:34

## 2024-06-08 RX ADMIN — PIPERACILLIN AND TAZOBACTAM 25 GRAM(S): 4; .5 INJECTION, POWDER, LYOPHILIZED, FOR SOLUTION INTRAVENOUS at 21:04

## 2024-06-08 RX ADMIN — OLANZAPINE 10 MILLIGRAM(S): 15 TABLET, FILM COATED ORAL at 21:05

## 2024-06-08 RX ADMIN — DIVALPROEX SODIUM 250 MILLIGRAM(S): 500 TABLET, DELAYED RELEASE ORAL at 21:05

## 2024-06-08 RX ADMIN — DIVALPROEX SODIUM 250 MILLIGRAM(S): 500 TABLET, DELAYED RELEASE ORAL at 11:34

## 2024-06-08 RX ADMIN — Medication 4 MILLIGRAM(S): at 21:05

## 2024-06-08 RX ADMIN — Medication 200 MILLIGRAM(S): at 05:18

## 2024-06-08 RX ADMIN — PIPERACILLIN AND TAZOBACTAM 25 GRAM(S): 4; .5 INJECTION, POWDER, LYOPHILIZED, FOR SOLUTION INTRAVENOUS at 05:19

## 2024-06-08 RX ADMIN — GABAPENTIN 300 MILLIGRAM(S): 400 CAPSULE ORAL at 05:18

## 2024-06-08 RX ADMIN — BUDESONIDE AND FORMOTEROL FUMARATE DIHYDRATE 2 PUFF(S): 160; 4.5 AEROSOL RESPIRATORY (INHALATION) at 11:31

## 2024-06-08 RX ADMIN — Medication 200 MILLIGRAM(S): at 17:33

## 2024-06-08 RX ADMIN — GABAPENTIN 300 MILLIGRAM(S): 400 CAPSULE ORAL at 21:05

## 2024-06-08 RX ADMIN — Medication 0.5 MILLIGRAM(S): at 05:18

## 2024-06-08 RX ADMIN — DIVALPROEX SODIUM 250 MILLIGRAM(S): 500 TABLET, DELAYED RELEASE ORAL at 05:18

## 2024-06-08 RX ADMIN — SENNA PLUS 2 TABLET(S): 8.6 TABLET ORAL at 21:05

## 2024-06-08 RX ADMIN — BUDESONIDE AND FORMOTEROL FUMARATE DIHYDRATE 2 PUFF(S): 160; 4.5 AEROSOL RESPIRATORY (INHALATION) at 21:04

## 2024-06-08 RX ADMIN — PIPERACILLIN AND TAZOBACTAM 25 GRAM(S): 4; .5 INJECTION, POWDER, LYOPHILIZED, FOR SOLUTION INTRAVENOUS at 11:34

## 2024-06-08 NOTE — PROGRESS NOTE ADULT - PROBLEM SELECTOR PLAN 5
On Prolixin Decanoate [id number]   - cannot leave AMA, lacks capacity to leave or refuse care   - c/w Zyprexa 10 mg qhs, Depakote 250mg q8H, cogentin 0.5 mg BID  - haldol 2.5 mg and ativan 1mg IV/IM q6H PRN for severe agitation   - psych following, appreciate recs

## 2024-06-08 NOTE — PHYSICAL THERAPY INITIAL EVALUATION ADULT - PERTINENT HX OF CURRENT PROBLEM, REHAB EVAL
68-year-old male, smoker, ambulatory with a walker, history of schizophrenia (hallucinations with televisions), hypertension, COPD, BPH,  muscle weakness, spondylosis with myelopathy, mild pulmonary HTN and moderate tricuspid regurgitation (TTE 2022); Pt presents as transfer from [facility] for osteomyelitis of the right mandible with abscess.  Patient states 4 days ago woke up with right facial swelling that has progressively worsened and became tender.

## 2024-06-08 NOTE — PROGRESS NOTE ADULT - ASSESSMENT
68M w/ PMH of HTN, Schizophrenia, presents to [facility] ED as transfer from [facility] w/ 3 day history of R sided facial swelling. CT Neck taken at outside hospital revealed R  space abscess w/ underlying osteomyelitis of mandible vs. malignancy, now POD3 s/p I&D of Right sided submasseteric, submandibular abscess, ectraction of #25-27, bone biopsy, 1x transcutaneous penrose drain on the Right neck. No growth on Abscess at 72h, bone bx pending, pt is progressing well     Plan:  -Trend wbc  -cultures, bone bx pending   -advance to full liquid diet   -appreciate ID recs   -multimodal pain control  -daily sterile gauze dressing change with skin tape  -tentative plan to d/c transcutaneous penrose drain 6/9/24  -rest of care per primary team    OMFS  #[id number]  Available on Teams    68M w/ PMH of HTN, Schizophrenia, presents to [facility] ED as transfer from [facility] w/ 3 day history of R sided facial swelling. CT Neck taken at outside hospital revealed R  space abscess w/ underlying osteomyelitis of mandible vs. malignancy, now POD3 s/p I&D of Right sided submasseteric, submandibular abscess, ectraction of #25-27, bone biopsy, 1x transcutaneous penrose drain on the Right neck. No growth on Abscess at 72h, bone bx pending, pt is progressing well     Plan:  -Trend wbc  -cultures, bone bx pending   -Diet: soft & bite sized diet as tolerated   -appreciate ID recs   -multimodal pain control  -daily sterile gauze dressing change with skin tape  -tentative plan to d/c transcutaneous penrose drain 6/9/24  -rest of care per primary team    OMFS  #[id number]  Available on Teams

## 2024-06-08 NOTE — PROGRESS NOTE ADULT - SUBJECTIVE AND OBJECTIVE BOX
INTERVAL HPI/OVERNIGHT EVENTS:    SUBJECTIVE: Patient seen and examined at bedside.    OBJECTIVE:    VITAL SIGNS:  ICU Vital Signs Last 24 Hrs  T(C): 36.7 (08 Jun 2024 04:00), Max: 36.9 (07 Jun 2024 07:40)  T(F): 98 (08 Jun 2024 04:00), Max: 98.5 (07 Jun 2024 07:40)  HR: 64 (08 Jun 2024 04:00) (63 - 75)  BP: 105/64 (08 Jun 2024 04:00) (104/62 - 116/68)  BP(mean): --  ABP: --  ABP(mean): --  RR: 17 (08 Jun 2024 04:00) (17 - 18)  SpO2: 99% (08 Jun 2024 04:00) (97% - 100%)    O2 Parameters below as of 08 Jun 2024 04:00  Patient On (Oxygen Delivery Method): room air              CAPILLARY BLOOD GLUCOSE          PHYSICAL EXAM:    General: NAD  HEENT: NC/AT; PERRL, clear conjunctiva  Neck: supple  Respiratory: CTA b/l  Cardiovascular: +S1/S2; RRR  Abdomen: soft, NT/ND; +BS x4  Extremities:  no LE edema  Vascular: WWP, 2+ peripheral pulses b/l;  Skin: normal color and turgor; no rash  Neurological: A&Ox3, move all extremities. CN II-XII intact    MEDICATIONS:  MEDICATIONS  (STANDING):  benztropine 0.5 milliGRAM(s) Oral every 12 hours  budesonide 160 MICROgram(s)/formoterol 4.5 MICROgram(s) Inhaler 2 Puff(s) Inhalation two times a day  divalproex  milliGRAM(s) Oral every 8 hours  doxazosin 4 milliGRAM(s) Oral at bedtime  enoxaparin Injectable 40 milliGRAM(s) SubCutaneous every 24 hours  finasteride 5 milliGRAM(s) Oral daily  gabapentin 300 milliGRAM(s) Oral every 8 hours  labetalol 200 milliGRAM(s) Oral every 12 hours  OLANZapine 10 milliGRAM(s) Oral at bedtime  piperacillin/tazobactam IVPB.. 3.375 Gram(s) IV Intermittent every 8 hours  senna 2 Tablet(s) Oral at bedtime  sodium chloride 0.9%. 1000 milliLiter(s) (75 mL/Hr) IV Continuous <Continuous>    MEDICATIONS  (PRN):  acetaminophen     Tablet .. 650 milliGRAM(s) Oral every 6 hours PRN Temp greater or equal to 38C (100.4F), Mild Pain (1 - 3)  haloperidol    Injectable 2.5 milliGRAM(s) IntraMuscular every 6 hours PRN severe agitation  LORazepam   Injectable 1 milliGRAM(s) IV Push every 6 hours PRN severe agitation  melatonin 3 milliGRAM(s) Oral at bedtime PRN Insomnia  ondansetron Injectable 4 milliGRAM(s) IV Push every 8 hours PRN Nausea and/or Vomiting      ALLERGIES:  Allergies    Thorazine (Drowsiness)  penicillin (Rash)  Haldol (Drowsiness)    Intolerances        LABS:                        10.5   7.72  )-----------( 294      ( 08 Jun 2024 05:30 )             31.3     06-08    131<L>  |  98  |  5<L>  ----------------------------<  79  4.2   |  22  |  0.80    Ca    8.4      08 Jun 2024 05:30  Phos  3.3     06-08  Mg     1.90     06-08        Urinalysis Basic - ( 08 Jun 2024 05:30 )    Color: x / Appearance: x / SG: x / pH: x  Gluc: 79 mg/dL / Ketone: x  / Bili: x / Urobili: x   Blood: x / Protein: x / Nitrite: x   Leuk Esterase: x / RBC: x / WBC x   Sq Epi: x / Non Sq Epi: x / Bacteria: x        RADIOLOGY & ADDITIONAL TESTS: Reviewed. INTERVAL HPI/OVERNIGHT EVENTS: DENIZ OVN     SUBJECTIVE: Patient seen and examined at bedside. Patient without complaint this AM - denying pain, fevers, chills.     OBJECTIVE:    VITAL SIGNS:  ICU Vital Signs Last 24 Hrs  T(C): 36.7 (08 Jun 2024 04:00), Max: 36.9 (07 Jun 2024 07:40)  T(F): 98 (08 Jun 2024 04:00), Max: 98.5 (07 Jun 2024 07:40)  HR: 64 (08 Jun 2024 04:00) (63 - 75)  BP: 105/64 (08 Jun 2024 04:00) (104/62 - 116/68)  BP(mean): --  ABP: --  ABP(mean): --  RR: 17 (08 Jun 2024 04:00) (17 - 18)  SpO2: 99% (08 Jun 2024 04:00) (97% - 100%)    O2 Parameters below as of 08 Jun 2024 04:00  Patient On (Oxygen Delivery Method): room air              CAPILLARY BLOOD GLUCOSE          PHYSICAL EXAM:    General: NAD  HEENT: PEERLA, right side of jaw with gauze in place, appears to be draining  Neck: supple  Respiratory: CTA b/l  Cardiovascular: +S1/S2; RRR  Abdomen: soft, NT/ND; +BS x4  Extremities:  no LE edema  Vascular: WWP, 2+ peripheral pulses b/l;  Skin: normal color and turgor; no rash  Neurological: A&Ox3, move all extremities.    MEDICATIONS:  MEDICATIONS  (STANDING):  benztropine 0.5 milliGRAM(s) Oral every 12 hours  budesonide 160 MICROgram(s)/formoterol 4.5 MICROgram(s) Inhaler 2 Puff(s) Inhalation two times a day  divalproex  milliGRAM(s) Oral every 8 hours  doxazosin 4 milliGRAM(s) Oral at bedtime  enoxaparin Injectable 40 milliGRAM(s) SubCutaneous every 24 hours  finasteride 5 milliGRAM(s) Oral daily  gabapentin 300 milliGRAM(s) Oral every 8 hours  labetalol 200 milliGRAM(s) Oral every 12 hours  OLANZapine 10 milliGRAM(s) Oral at bedtime  piperacillin/tazobactam IVPB.. 3.375 Gram(s) IV Intermittent every 8 hours  senna 2 Tablet(s) Oral at bedtime  sodium chloride 0.9%. 1000 milliLiter(s) (75 mL/Hr) IV Continuous <Continuous>    MEDICATIONS  (PRN):  acetaminophen     Tablet .. 650 milliGRAM(s) Oral every 6 hours PRN Temp greater or equal to 38C (100.4F), Mild Pain (1 - 3)  haloperidol    Injectable 2.5 milliGRAM(s) IntraMuscular every 6 hours PRN severe agitation  LORazepam   Injectable 1 milliGRAM(s) IV Push every 6 hours PRN severe agitation  melatonin 3 milliGRAM(s) Oral at bedtime PRN Insomnia  ondansetron Injectable 4 milliGRAM(s) IV Push every 8 hours PRN Nausea and/or Vomiting      ALLERGIES:  Allergies    Thorazine (Drowsiness)  penicillin (Rash)  Haldol (Drowsiness)    Intolerances        LABS:                        10.5   7.72  )-----------( 294      ( 08 Jun 2024 05:30 )             31.3     06-08    131<L>  |  98  |  5<L>  ----------------------------<  79  4.2   |  22  |  0.80    Ca    8.4      08 Jun 2024 05:30  Phos  3.3     06-08  Mg     1.90     06-08        Urinalysis Basic - ( 08 Jun 2024 05:30 )    Color: x / Appearance: x / SG: x / pH: x  Gluc: 79 mg/dL / Ketone: x  / Bili: x / Urobili: x   Blood: x / Protein: x / Nitrite: x   Leuk Esterase: x / RBC: x / WBC x   Sq Epi: x / Non Sq Epi: x / Bacteria: x        RADIOLOGY & ADDITIONAL TESTS: Reviewed.

## 2024-06-08 NOTE — PROGRESS NOTE ADULT - SUBJECTIVE AND OBJECTIVE BOX
HPI:  68M w/ PMH of HTN, Schizophrenia, presents to [facility] ED as transfer from [facility] w/ 3 day history of R sided facial swelling. CT Neck taken at outside hospital revealed R  space abscess w/ underlying osteomyelitis of mandible vs. malignancy. On examination, pt w/ no acute complaints. Reports mild R sided facial pain. Otherwise, denies any fever, chills, difficulty breathing or swallowing or blurry vision. WBC 13.4       Now POD3 s/p I&D of Right sided submasseteric, submandibular abscess, extraction of #25-27, bone biopsy, 1x transcutaneous penrose drain on the Right neck.    Interval/ Subjective: NAEON. Reports feeling better this AM.     ICU Vital Signs Last 24 Hrs  T(C): 36.7 (08 Jun 2024 04:00), Max: 36.9 (07 Jun 2024 17:10)  T(F): 98 (08 Jun 2024 04:00), Max: 98.5 (07 Jun 2024 17:10)  HR: 64 (08 Jun 2024 04:00) (63 - 68)  BP: 105/64 (08 Jun 2024 04:00) (104/62 - 116/68)  BP(mean): --  ABP: --  ABP(mean): --  RR: 17 (08 Jun 2024 04:00) (17 - 18)  SpO2: 99% (08 Jun 2024 04:00) (97% - 100%)    O2 Parameters below as of 08 Jun 2024 04:00  Patient On (Oxygen Delivery Method): room air        I&O's Detail    MEDICATIONS  (STANDING):  benztropine 0.5 milliGRAM(s) Oral every 12 hours  budesonide 160 MICROgram(s)/formoterol 4.5 MICROgram(s) Inhaler 2 Puff(s) Inhalation two times a day  divalproex  milliGRAM(s) Oral every 8 hours  doxazosin 4 milliGRAM(s) Oral at bedtime  enoxaparin Injectable 40 milliGRAM(s) SubCutaneous every 24 hours  finasteride 5 milliGRAM(s) Oral daily  gabapentin 300 milliGRAM(s) Oral every 8 hours  labetalol 200 milliGRAM(s) Oral every 12 hours  OLANZapine 10 milliGRAM(s) Oral at bedtime  piperacillin/tazobactam IVPB.. 3.375 Gram(s) IV Intermittent every 8 hours  senna 2 Tablet(s) Oral at bedtime  sodium chloride 0.9%. 1000 milliLiter(s) (75 mL/Hr) IV Continuous <Continuous>    MEDICATIONS  (PRN):  acetaminophen     Tablet .. 650 milliGRAM(s) Oral every 6 hours PRN Temp greater or equal to 38C (100.4F), Mild Pain (1 - 3)  haloperidol    Injectable 2.5 milliGRAM(s) IntraMuscular every 6 hours PRN severe agitation  LORazepam   Injectable 1 milliGRAM(s) IV Push every 6 hours PRN severe agitation  melatonin 3 milliGRAM(s) Oral at bedtime PRN Insomnia  ondansetron Injectable 4 milliGRAM(s) IV Push every 8 hours PRN Nausea and/or Vomiting    PE  Gen: NAD, AAOx3, in better spirits this morning  HEEN: R neck edema improving. Serosanguinous drainage on palpation. Overlying gauze dressing with minimally saturated with purulence.    Maxillofacial: intraoral site w/ sutures c/i/h, FOM soft and non-raised b/l, uvula midline  Resp: unlabored resp on RA  Ext: wwp                          10.5   7.72  )-----------( 294      ( 08 Jun 2024 05:30 )             31.3   06-08    131<L>  |  98  |  5<L>  ----------------------------<  79  4.2   |  22  |  0.80    Ca    8.4      08 Jun 2024 05:30  Phos  3.3     06-08  Mg     1.90     06-08      Culture - Fungal, Other (collected 05 Jun 2024 21:20)  Source: .Other right neck abscess  Preliminary Report (07 Jun 2024 07:19):    Testing in progress    Culture - Abscess with Gram Stain (collected 05 Jun 2024 21:20)  Source: .Abscess right neck abscess  Gram Stain (06 Jun 2024 14:02):    Moderate polymorphonuclear leukocytes per low power field    No organisms seen per oil power field  Preliminary Report (07 Jun 2024 12:56):    No growth to date.    Culture - Fungal, Other (collected 05 Jun 2024 21:20)  Source: .Other ABS RIGHT NECK 1  Preliminary Report (07 Jun 2024 07:19):    Testing in progress    Culture - Abscess with Gram Stain (collected 05 Jun 2024 21:20)  Source: .Abscess ABS RIGHT NECK 1  Gram Stain (06 Jun 2024 14:04):    Moderate polymorphonuclear leukocytes per low power field    No organisms seen per oil power field     HPI:  68M w/ PMH of HTN, Schizophrenia, presents to [facility] ED as transfer from [facility] w/ 3 day history of R sided facial swelling. CT Neck taken at outside hospital revealed R  space abscess w/ underlying osteomyelitis of mandible vs. malignancy. On examination, pt w/ no acute complaints. Reports mild R sided facial pain. Otherwise, denies any fever, chills, difficulty breathing or swallowing or blurry vision. WBC 13.4       Now POD3 s/p I&D of Right sided submasseteric, submandibular abscess, extraction of #25-27, bone biopsy, 1x transcutaneous penrose drain on the Right neck.    Interval/ Subjective: NAEON. Reports feeling better this AM.     ICU Vital Signs Last 24 Hrs  T(C): 36.7 (08 Jun 2024 04:00), Max: 36.9 (07 Jun 2024 17:10)  T(F): 98 (08 Jun 2024 04:00), Max: 98.5 (07 Jun 2024 17:10)  HR: 64 (08 Jun 2024 04:00) (63 - 68)  BP: 105/64 (08 Jun 2024 04:00) (104/62 - 116/68)  BP(mean): --  ABP: --  ABP(mean): --  RR: 17 (08 Jun 2024 04:00) (17 - 18)  SpO2: 99% (08 Jun 2024 04:00) (97% - 100%)    O2 Parameters below as of 08 Jun 2024 04:00  Patient On (Oxygen Delivery Method): room air        I&O's Detail    MEDICATIONS  (STANDING):  benztropine 0.5 milliGRAM(s) Oral every 12 hours  budesonide 160 MICROgram(s)/formoterol 4.5 MICROgram(s) Inhaler 2 Puff(s) Inhalation two times a day  divalproex  milliGRAM(s) Oral every 8 hours  doxazosin 4 milliGRAM(s) Oral at bedtime  enoxaparin Injectable 40 milliGRAM(s) SubCutaneous every 24 hours  finasteride 5 milliGRAM(s) Oral daily  gabapentin 300 milliGRAM(s) Oral every 8 hours  labetalol 200 milliGRAM(s) Oral every 12 hours  OLANZapine 10 milliGRAM(s) Oral at bedtime  piperacillin/tazobactam IVPB.. 3.375 Gram(s) IV Intermittent every 8 hours  senna 2 Tablet(s) Oral at bedtime  sodium chloride 0.9%. 1000 milliLiter(s) (75 mL/Hr) IV Continuous <Continuous>    MEDICATIONS  (PRN):  acetaminophen     Tablet .. 650 milliGRAM(s) Oral every 6 hours PRN Temp greater or equal to 38C (100.4F), Mild Pain (1 - 3)  haloperidol    Injectable 2.5 milliGRAM(s) IntraMuscular every 6 hours PRN severe agitation  LORazepam   Injectable 1 milliGRAM(s) IV Push every 6 hours PRN severe agitation  melatonin 3 milliGRAM(s) Oral at bedtime PRN Insomnia  ondansetron Injectable 4 milliGRAM(s) IV Push every 8 hours PRN Nausea and/or Vomiting    PE  Gen: NAD, AAOx3, in better spirits this morning  HEEN: R neck edema improving. Serosanguinous drainage on palpation. Overlying gauze dressing with minimally saturated with purulence.    Maxillofacial: intraoral site w/ sutures c/i/h, FOM soft and non-raised b/l, uvula midline  Resp: unlabored resp on RA  Ext: wwp                          10.5   7.72  )-----------( 294      ( 08 Jun 2024 05:30 )             31.3   06-08    131<L>  |  98  |  5<L>  ----------------------------<  79  4.2   |  22  |  0.80    Ca    8.4      08 Jun 2024 05:30  Phos  3.3     06-08  Mg     1.90     06-08      Culture - Fungal, Other (collected 05 Jun 2024 21:20)  Source: .Other right neck abscess  Preliminary Report (07 Jun 2024 07:19):    Testing in progress    Culture - Abscess with Gram Stain (collected 05 Jun 2024 21:20)  Source: .Abscess right neck abscess  Gram Stain (06 Jun 2024 14:02):    Moderate polymorphonuclear leukocytes per low power field    No organisms seen per oil power field  Preliminary Report (07 Jun 2024 12:56):    No growth to date.    Culture - Fungal, Other (collected 05 Jun 2024 21:20)  Source: .Other ABS RIGHT NECK 1  Preliminary Report (07 Jun 2024 07:19):    Testing in progress    Culture - Abscess with Gram Stain (collected 05 Jun 2024 21:20)  Source: .Abscess ABS RIGHT NECK 1  Gram Stain (06 Jun 2024 14:04):    Moderate polymorphonuclear leukocytes per low power field    No organisms seen per oil power field

## 2024-06-08 NOTE — PROGRESS NOTE ADULT - ATTENDING COMMENTS
In brief,     #Severe Osteo of mandible   - appreciate  ID - can dc vanco, will f.u MRSA PCR - cw zosyn   inflammatory markers elevated, CT w bony erosions, OR cx and path pending - anticipate long course IV abx considering concern for OM  #Schizophrenia   amy psych - psychotropics per psych, lacks capacity, cannot AMA - no inpt needs - monitor qtc on psychotropics    PT eval to help facilitate dispo if needs long term abx can complete course in rehab   for now unable to identify any family/surrogate     dispo - pending abx recs / drain.

## 2024-06-08 NOTE — PROGRESS NOTE ADULT - PROBLEM SELECTOR PLAN 1
CT Neck: Extensive aggressive appearing bone erosion throughout the mandible. Swollen complex septated fluid appearance throughout the right  space involving both sides of the masseter muscle. Severe osteomyelitis with abscess in the right  space versus malignancy.    -S/p I&D 6/5  -c/w CLD   -C/w Zosyn IV (6/5 - ). Vancomycin discontinued per ID recs  -Pain control: Dilaudid IV PRN --> 0.5 mg for moderate pain, 1 mg for severe pain   -ID consulted, appreciate recs  -Pending abscess cultures and speciation  [] f/u MRSA swab CT Neck: Extensive aggressive appearing bone erosion throughout the mandible. Swollen complex septated fluid appearance throughout the right  space involving both sides of the masseter muscle. Severe osteomyelitis with abscess in the right  space versus malignancy.    -S/p I&D 6/5  -c/w CLD   -C/w Zosyn IV (6/5 - ). Vancomycin discontinued per ID recs  -Pain control: Dilaudid IV PRN --> 0.5 mg for moderate pain, 1 mg for severe pain   -ID consulted, appreciate recs  -Pending abscess cultures and speciation  -MRSA negative

## 2024-06-08 NOTE — PROGRESS NOTE ADULT - ASSESSMENT
67yo male, PMHx smoking, schizophrenia, HTN, COPD, BPH,  muscle weakness, spondylosis with myelopathy, mild pulmonary HTN and moderate tricuspid regurgitation (TTE 2022); Presents as transfer from [facility] a/w severe osteomyelitis of the right mandible with abscess with associated leukocytosis.

## 2024-06-08 NOTE — PHYSICAL THERAPY INITIAL EVALUATION ADULT - PLANNED THERAPY INTERVENTIONS, PT EVAL
Patient left positioned for safety in bed in NAD, call bell in reach, all lines intact. +bed alarm./balance training/bed mobility training/strengthening/transfer training

## 2024-06-09 LAB
HCT VFR BLD CALC: 30.9 % — LOW (ref 39–50)
HGB BLD-MCNC: 10.4 G/DL — LOW (ref 13–17)
MCHC RBC-ENTMCNC: 27.8 PG — SIGNIFICANT CHANGE UP (ref 27–34)
MCHC RBC-ENTMCNC: 33.7 GM/DL — SIGNIFICANT CHANGE UP (ref 32–36)
MCV RBC AUTO: 82.6 FL — SIGNIFICANT CHANGE UP (ref 80–100)
NRBC # BLD: 0 /100 WBCS — SIGNIFICANT CHANGE UP (ref 0–0)
NRBC # FLD: 0 K/UL — SIGNIFICANT CHANGE UP (ref 0–0)
PLATELET # BLD AUTO: 305 K/UL — SIGNIFICANT CHANGE UP (ref 150–400)
RBC # BLD: 3.74 M/UL — LOW (ref 4.2–5.8)
RBC # FLD: 14.2 % — SIGNIFICANT CHANGE UP (ref 10.3–14.5)
WBC # BLD: 8.67 K/UL — SIGNIFICANT CHANGE UP (ref 3.8–10.5)
WBC # FLD AUTO: 8.67 K/UL — SIGNIFICANT CHANGE UP (ref 3.8–10.5)

## 2024-06-09 PROCEDURE — 99232 SBSQ HOSP IP/OBS MODERATE 35: CPT | Mod: GC

## 2024-06-09 RX ADMIN — Medication 4 MILLIGRAM(S): at 21:17

## 2024-06-09 RX ADMIN — ENOXAPARIN SODIUM 40 MILLIGRAM(S): 100 INJECTION SUBCUTANEOUS at 21:17

## 2024-06-09 RX ADMIN — GABAPENTIN 300 MILLIGRAM(S): 400 CAPSULE ORAL at 21:17

## 2024-06-09 RX ADMIN — Medication 200 MILLIGRAM(S): at 05:03

## 2024-06-09 RX ADMIN — PIPERACILLIN AND TAZOBACTAM 25 GRAM(S): 4; .5 INJECTION, POWDER, LYOPHILIZED, FOR SOLUTION INTRAVENOUS at 11:50

## 2024-06-09 RX ADMIN — PIPERACILLIN AND TAZOBACTAM 25 GRAM(S): 4; .5 INJECTION, POWDER, LYOPHILIZED, FOR SOLUTION INTRAVENOUS at 21:15

## 2024-06-09 RX ADMIN — DIVALPROEX SODIUM 250 MILLIGRAM(S): 500 TABLET, DELAYED RELEASE ORAL at 11:49

## 2024-06-09 RX ADMIN — FINASTERIDE 5 MILLIGRAM(S): 5 TABLET, FILM COATED ORAL at 11:48

## 2024-06-09 RX ADMIN — PIPERACILLIN AND TAZOBACTAM 25 GRAM(S): 4; .5 INJECTION, POWDER, LYOPHILIZED, FOR SOLUTION INTRAVENOUS at 05:02

## 2024-06-09 RX ADMIN — DIVALPROEX SODIUM 250 MILLIGRAM(S): 500 TABLET, DELAYED RELEASE ORAL at 05:03

## 2024-06-09 RX ADMIN — OLANZAPINE 10 MILLIGRAM(S): 15 TABLET, FILM COATED ORAL at 21:16

## 2024-06-09 RX ADMIN — DIVALPROEX SODIUM 250 MILLIGRAM(S): 500 TABLET, DELAYED RELEASE ORAL at 21:17

## 2024-06-09 RX ADMIN — Medication 0.5 MILLIGRAM(S): at 05:03

## 2024-06-09 RX ADMIN — Medication 200 MILLIGRAM(S): at 16:58

## 2024-06-09 RX ADMIN — BUDESONIDE AND FORMOTEROL FUMARATE DIHYDRATE 2 PUFF(S): 160; 4.5 AEROSOL RESPIRATORY (INHALATION) at 21:15

## 2024-06-09 RX ADMIN — Medication 0.5 MILLIGRAM(S): at 16:58

## 2024-06-09 RX ADMIN — GABAPENTIN 300 MILLIGRAM(S): 400 CAPSULE ORAL at 11:48

## 2024-06-09 RX ADMIN — BUDESONIDE AND FORMOTEROL FUMARATE DIHYDRATE 2 PUFF(S): 160; 4.5 AEROSOL RESPIRATORY (INHALATION) at 11:48

## 2024-06-09 RX ADMIN — GABAPENTIN 300 MILLIGRAM(S): 400 CAPSULE ORAL at 05:03

## 2024-06-09 RX ADMIN — SENNA PLUS 2 TABLET(S): 8.6 TABLET ORAL at 21:16

## 2024-06-09 NOTE — PROGRESS NOTE ADULT - ASSESSMENT
67yo male, PMHx smoking, schizophrenia, HTN, COPD, BPH,  muscle weakness, spondylosis with myelopathy, mild pulmonary HTN and moderate tricuspid regurgitation (TTE 2022); Presents as transfer from [facility] a/w severe osteomyelitis of the right mandible with abscess with associated leukocytosis.

## 2024-06-09 NOTE — PROGRESS NOTE ADULT - TIME BILLING
Review of laboratory data, radiology results, consultants' recommendations, documentation in [place], discussion with patient/house staff/ACP and interdisciplinary staff (such as , social workers, etc). Interventions were performed as documented above.
Review of laboratory data, radiology results, consultants' recommendations, documentation in [place], discussion with patient/house staff/ACP and interdisciplinary staff (such as , social workers, etc). Interventions were performed as documented above.
lab, consultants, data review

## 2024-06-09 NOTE — PROGRESS NOTE ADULT - PROBLEM SELECTOR PLAN 4
-Hold Amlodipine and Enalapril given high risk for sepsis and pending OR intervention by OMFS   -VS q4h  -c/w Labetalol 200 mg BID -Hold Amlodipine and Enalapril given high risk for sepsis and pending OR intervention by OMFS   -c/w Labetalol 200 mg BID

## 2024-06-09 NOTE — PROGRESS NOTE ADULT - PROBLEM SELECTOR PLAN 5
On Prolixin Decanoate [id number]   - cannot leave AMA, lacks capacity to leave or refuse care   - c/w Zyprexa 10 mg qhs, Depakote 250mg q8H, cogentin 0.5 mg BID  - haldol 2.5 mg and ativan 1mg IV/IM q6H PRN for severe agitation   - psych following, appreciate recs

## 2024-06-09 NOTE — PROGRESS NOTE ADULT - ASSESSMENT
68M w/ PMH of HTN, Schizophrenia, presents to [facility] ED as transfer from [facility] w/ 3 day history of R sided facial swelling. CT Neck taken at outside hospital revealed R  space abscess w/ underlying osteomyelitis of mandible vs. malignancy, now POD3 s/p I&D of Right sided submasseteric, submandibular abscess, ectraction of #25-27, bone biopsy, 1x transcutaneous penrose drain on the Right neck. No growth on Abscess at 72h, bone bx pending, pt is progressing well     Plan:  - no further OMFS intervention noted  -Trend wbc  - bone bx pending   -Diet: soft & bite sized diet as tolerated   -appreciate ID recs   -multimodal pain control  -daily sterile gauze dressing change with skin tape  -penrose drain now removed  -rest of care per primary team    - [facility] Dr. Hansel George  - Please call [phone] to confirm appt    [facility]   OMFS/ Dental clinic: 1st floor  [address] [place] [address]  [phone]      Raulito Darnell DDS  OMFS  [facility] Pager: [phone]  [facility] Pager: [phone]  [facility] Pager: [phone]  [employer] on [employer] Teams (PLEASE USE RESIDENT)

## 2024-06-09 NOTE — PROGRESS NOTE ADULT - PROBLEM SELECTOR PLAN 1
CT Neck: Extensive aggressive appearing bone erosion throughout the mandible. Swollen complex septated fluid appearance throughout the right  space involving both sides of the masseter muscle. Severe osteomyelitis with abscess in the right  space versus malignancy.    -S/p I&D 6/5  -c/w CLD   -C/w Zosyn IV (6/5 - ). Vancomycin discontinued per ID recs  -Pain control: Dilaudid IV PRN --> 0.5 mg for moderate pain, 1 mg for severe pain   -ID consulted, appreciate recs  -Pending abscess cultures and speciation  -MRSA negative CT Neck: Extensive aggressive appearing bone erosion throughout the mandible. Swollen complex septated fluid appearance throughout the right  space involving both sides of the masseter muscle. Severe osteomyelitis with abscess in the right  space versus malignancy.    -S/p I&D 6/5  -c/w CLD   -C/w Zosyn IV (6/5 - 6/10). Vancomycin discontinued per ID recs  -ID consulted, appreciate recs  -Abscess cultures: Rare Bacillus species not anthracis   -MRSA negative

## 2024-06-09 NOTE — PROGRESS NOTE ADULT - SUBJECTIVE AND OBJECTIVE BOX
*******************************  Blanca Tucker MD (PGY-1)  Internal Medicine  Contact via [employer] TEAMS  *******************************    INTERVAL HPI/OVERNIGHT EVENTS:      OBJECTIVE  T(C): 36.9 (06-09-24 @ 04:00), Max: 36.9 (06-08-24 @ 20:30)  HR: 67 (06-09-24 @ 04:00) (64 - 72)  BP: 117/71 (06-09-24 @ 04:00) (107/63 - 117/71)  RR: 17 (06-09-24 @ 04:00) (16 - 18)  SpO2: 100% (06-09-24 @ 04:00) (97% - 100%)      PHYSICAL EXAM  General:  HEENT:  Cardiovascular:  Pulmonary:  GI:  :  Neuro:  Psych:          IMAGING:     *******************************  Blanca Tucker MD (PGY-1)  Internal Medicine  Contact via [employer] TEAMS  *******************************    INTERVAL HPI/OVERNIGHT EVENTS:  NAEON    OBJECTIVE  T(C): 36.9 (06-09-24 @ 04:00), Max: 36.9 (06-08-24 @ 20:30)  HR: 67 (06-09-24 @ 04:00) (64 - 72)  BP: 117/71 (06-09-24 @ 04:00) (107/63 - 117/71)  RR: 17 (06-09-24 @ 04:00) (16 - 18)  SpO2: 100% (06-09-24 @ 04:00) (97% - 100%)      PHYSICAL EXAM  GENERAL: NAD, lying in bed comfortably  EYES: EOMI, PERRLA, conjunctiva and sclera clear  HEENT : +R. sided facial and submandibular edema with penrose drain in place w/ overlying gauze   HEART: Regular rate and rhythm, no murmurs, rubs, or gallops  LUNGS: Unlabored respirations.   NERVOUS SYSTEM:  A&Ox1-2, moving all extremities, no focal deficits   PSYCH: Lacks capacity and insight, occasionally aggressive and agitated        IMAGING:

## 2024-06-09 NOTE — PROGRESS NOTE ADULT - SUBJECTIVE AND OBJECTIVE BOX
ELAINE HERNANDEZ  MRN-[id number]  [facility] 7S      68M w/ PMH of HTN, Schizophrenia, presents to [facility] ED as transfer from [facility] w/ 3 day history of R sided facial swelling. CT Neck taken at outside hospital revealed R  space abscess w/ underlying osteomyelitis of mandible vs. malignancy. On examination, pt w/ no acute complaints. Reports mild R sided facial pain. Otherwise, denies any fever, chills, difficulty breathing or swallowing or blurry vision. WBC 13.4       Now POD3 s/p I&D of Right sided submasseteric, submandibular abscess, extraction of #25-27, bone biopsy, 1x transcutaneous penrose drain on the Right neck.    Interval/ Subjective: NAEON. Reports feeling better this AM. penrose drain removed this morning.     HEENT:  Head: normocephalic  E: normal hearing  E: PERRL, no conjunctival hemmorage noted  N: nares clear, minimal heme consistent with post-operative bleeding  T: neck soft and supple, no sings of indurations. no LAD    Intraoral exam:        Vitals:    Vital Signs Last 24 Hrs  T(C): 36.9 (09 Jun 2024 04:00), Max: 36.9 (08 Jun 2024 20:30)  T(F): 98.4 (09 Jun 2024 04:00), Max: 98.4 (08 Jun 2024 20:30)  HR: 67 (09 Jun 2024 04:00) (64 - 72)  BP: 117/71 (09 Jun 2024 04:00) (107/63 - 117/71)  BP(mean): --  RR: 17 (09 Jun 2024 04:00) (16 - 18)  SpO2: 100% (09 Jun 2024 04:00) (97% - 100%)    Parameters below as of 09 Jun 2024 04:00  Patient On (Oxygen Delivery Method): room air        I&O's Detail        Medications:    MEDICATIONS  (STANDING):  benztropine 0.5 milliGRAM(s) Oral every 12 hours  budesonide 160 MICROgram(s)/formoterol 4.5 MICROgram(s) Inhaler 2 Puff(s) Inhalation two times a day  divalproex  milliGRAM(s) Oral every 8 hours  doxazosin 4 milliGRAM(s) Oral at bedtime  enoxaparin Injectable 40 milliGRAM(s) SubCutaneous every 24 hours  finasteride 5 milliGRAM(s) Oral daily  gabapentin 300 milliGRAM(s) Oral every 8 hours  labetalol 200 milliGRAM(s) Oral every 12 hours  OLANZapine 10 milliGRAM(s) Oral at bedtime  piperacillin/tazobactam IVPB.. 3.375 Gram(s) IV Intermittent every 8 hours  senna 2 Tablet(s) Oral at bedtime    MEDICATIONS  (PRN):  acetaminophen     Tablet .. 650 milliGRAM(s) Oral every 6 hours PRN Temp greater or equal to 38C (100.4F), Mild Pain (1 - 3)  haloperidol    Injectable 2.5 milliGRAM(s) IntraMuscular every 6 hours PRN severe agitation  LORazepam   Injectable 1 milliGRAM(s) IV Push every 6 hours PRN severe agitation  melatonin 3 milliGRAM(s) Oral at bedtime PRN Insomnia  ondansetron Injectable 4 milliGRAM(s) IV Push every 8 hours PRN Nausea and/or Vomiting        Labs:                          10.4   8.67  )-----------( 305      ( 09 Jun 2024 05:45 )             30.9       06-08    131<L>  |  98  |  5<L>  ----------------------------<  79  4.2   |  22  |  0.80    Ca    8.4      08 Jun 2024 05:30  Phos  3.3     06-08  Mg     1.90     06-08        BMI: BMI (kg/m2): 30 (06-05-24 @ 20:07)  HbA1c:   Glucose: POCT Blood Glucose.: 97 mg/dL (06-05-24 @ 07:32)    BP: 117/71 (06-09-24 @ 04:00) (104/62 - 120/72)Vital Signs Last 24 Hrs  T(C): 36.9 (06-09-24 @ 04:00), Max: 36.9 (06-08-24 @ 20:30)  T(F): 98.4 (06-09-24 @ 04:00), Max: 98.4 (06-08-24 @ 20:30)  HR: 67 (06-09-24 @ 04:00) (64 - 72)  BP: 117/71 (06-09-24 @ 04:00) (107/63 - 117/71)  BP(mean): --  RR: 17 (06-09-24 @ 04:00) (16 - 18)  SpO2: 100% (06-09-24 @ 04:00) (97% - 100%)      Lipid Panel:         CAPILLARY BLOOD GLUCOSE              Urinalysis Basic - ( 08 Jun 2024 05:30 )    Color: x / Appearance: x / SG: x / pH: x  Gluc: 79 mg/dL / Ketone: x  / Bili: x / Urobili: x   Blood: x / Protein: x / Nitrite: x   Leuk Esterase: x / RBC: x / WBC x   Sq Epi: x / Non Sq Epi: x / Bacteria: x

## 2024-06-10 LAB
ANION GAP SERPL CALC-SCNC: 12 MMOL/L — SIGNIFICANT CHANGE UP (ref 7–14)
BUN SERPL-MCNC: 5 MG/DL — LOW (ref 7–23)
CALCIUM SERPL-MCNC: 8.6 MG/DL — SIGNIFICANT CHANGE UP (ref 8.4–10.5)
CHLORIDE SERPL-SCNC: 98 MMOL/L — SIGNIFICANT CHANGE UP (ref 98–107)
CO2 SERPL-SCNC: 22 MMOL/L — SIGNIFICANT CHANGE UP (ref 22–31)
CREAT SERPL-MCNC: 0.82 MG/DL — SIGNIFICANT CHANGE UP (ref 0.5–1.3)
EGFR: 96 ML/MIN/1.73M2 — SIGNIFICANT CHANGE UP
GLUCOSE SERPL-MCNC: 82 MG/DL — SIGNIFICANT CHANGE UP (ref 70–99)
HCT VFR BLD CALC: 32.1 % — LOW (ref 39–50)
HGB BLD-MCNC: 10.8 G/DL — LOW (ref 13–17)
MAGNESIUM SERPL-MCNC: 1.9 MG/DL — SIGNIFICANT CHANGE UP (ref 1.6–2.6)
MCHC RBC-ENTMCNC: 28.1 PG — SIGNIFICANT CHANGE UP (ref 27–34)
MCHC RBC-ENTMCNC: 33.6 GM/DL — SIGNIFICANT CHANGE UP (ref 32–36)
MCV RBC AUTO: 83.6 FL — SIGNIFICANT CHANGE UP (ref 80–100)
NRBC # BLD: 0 /100 WBCS — SIGNIFICANT CHANGE UP (ref 0–0)
NRBC # FLD: 0 K/UL — SIGNIFICANT CHANGE UP (ref 0–0)
PHOSPHATE SERPL-MCNC: 3.2 MG/DL — SIGNIFICANT CHANGE UP (ref 2.5–4.5)
PLATELET # BLD AUTO: 333 K/UL — SIGNIFICANT CHANGE UP (ref 150–400)
POTASSIUM SERPL-MCNC: 4.1 MMOL/L — SIGNIFICANT CHANGE UP (ref 3.5–5.3)
POTASSIUM SERPL-SCNC: 4.1 MMOL/L — SIGNIFICANT CHANGE UP (ref 3.5–5.3)
RBC # BLD: 3.84 M/UL — LOW (ref 4.2–5.8)
RBC # FLD: 14.1 % — SIGNIFICANT CHANGE UP (ref 10.3–14.5)
SODIUM SERPL-SCNC: 132 MMOL/L — LOW (ref 135–145)
WBC # BLD: 9.67 K/UL — SIGNIFICANT CHANGE UP (ref 3.8–10.5)
WBC # FLD AUTO: 9.67 K/UL — SIGNIFICANT CHANGE UP (ref 3.8–10.5)

## 2024-06-10 PROCEDURE — 99232 SBSQ HOSP IP/OBS MODERATE 35: CPT | Mod: GC

## 2024-06-10 PROCEDURE — 99232 SBSQ HOSP IP/OBS MODERATE 35: CPT

## 2024-06-10 RX ADMIN — SENNA PLUS 2 TABLET(S): 8.6 TABLET ORAL at 21:32

## 2024-06-10 RX ADMIN — Medication 200 MILLIGRAM(S): at 05:51

## 2024-06-10 RX ADMIN — GABAPENTIN 300 MILLIGRAM(S): 400 CAPSULE ORAL at 21:32

## 2024-06-10 RX ADMIN — FINASTERIDE 5 MILLIGRAM(S): 5 TABLET, FILM COATED ORAL at 13:00

## 2024-06-10 RX ADMIN — GABAPENTIN 300 MILLIGRAM(S): 400 CAPSULE ORAL at 13:00

## 2024-06-10 RX ADMIN — PIPERACILLIN AND TAZOBACTAM 25 GRAM(S): 4; .5 INJECTION, POWDER, LYOPHILIZED, FOR SOLUTION INTRAVENOUS at 21:31

## 2024-06-10 RX ADMIN — PIPERACILLIN AND TAZOBACTAM 25 GRAM(S): 4; .5 INJECTION, POWDER, LYOPHILIZED, FOR SOLUTION INTRAVENOUS at 05:50

## 2024-06-10 RX ADMIN — GABAPENTIN 300 MILLIGRAM(S): 400 CAPSULE ORAL at 05:50

## 2024-06-10 RX ADMIN — DIVALPROEX SODIUM 250 MILLIGRAM(S): 500 TABLET, DELAYED RELEASE ORAL at 05:51

## 2024-06-10 RX ADMIN — Medication 0.5 MILLIGRAM(S): at 18:48

## 2024-06-10 RX ADMIN — BUDESONIDE AND FORMOTEROL FUMARATE DIHYDRATE 2 PUFF(S): 160; 4.5 AEROSOL RESPIRATORY (INHALATION) at 13:01

## 2024-06-10 RX ADMIN — DIVALPROEX SODIUM 250 MILLIGRAM(S): 500 TABLET, DELAYED RELEASE ORAL at 13:00

## 2024-06-10 RX ADMIN — ENOXAPARIN SODIUM 40 MILLIGRAM(S): 100 INJECTION SUBCUTANEOUS at 21:33

## 2024-06-10 RX ADMIN — OLANZAPINE 10 MILLIGRAM(S): 15 TABLET, FILM COATED ORAL at 21:32

## 2024-06-10 RX ADMIN — Medication 4 MILLIGRAM(S): at 21:32

## 2024-06-10 RX ADMIN — Medication 200 MILLIGRAM(S): at 18:48

## 2024-06-10 RX ADMIN — BUDESONIDE AND FORMOTEROL FUMARATE DIHYDRATE 2 PUFF(S): 160; 4.5 AEROSOL RESPIRATORY (INHALATION) at 21:33

## 2024-06-10 RX ADMIN — Medication 0.5 MILLIGRAM(S): at 05:50

## 2024-06-10 RX ADMIN — DIVALPROEX SODIUM 250 MILLIGRAM(S): 500 TABLET, DELAYED RELEASE ORAL at 21:32

## 2024-06-10 RX ADMIN — PIPERACILLIN AND TAZOBACTAM 25 GRAM(S): 4; .5 INJECTION, POWDER, LYOPHILIZED, FOR SOLUTION INTRAVENOUS at 13:01

## 2024-06-10 NOTE — PROGRESS NOTE ADULT - SUBJECTIVE AND OBJECTIVE BOX
*******************************  Blanca Tucker MD (PGY-1)  Internal Medicine  Contact via [employer] TEAMS  *******************************    INTERVAL HPI/OVERNIGHT EVENTS:      OBJECTIVE  T(C): 36.8 (06-10-24 @ 05:00), Max: 36.8 (06-10-24 @ 05:00)  HR: 62 (06-10-24 @ 05:00) (59 - 65)  BP: 115/58 (06-10-24 @ 05:00) (111/62 - 125/83)  RR: 18 (06-10-24 @ 05:00) (18 - 18)  SpO2: 97% (06-10-24 @ 05:00) (95% - 100%)      PHYSICAL EXAM  General:  HEENT:  Cardiovascular:  Pulmonary:  GI:  :  Neuro:  Psych:          IMAGING:     *******************************  Blanca Tucker MD (PGY-1)  Internal Medicine  Contact via [employer] TEAMS  *******************************    INTERVAL HPI/OVERNIGHT EVENTS:  No acute overnight events.     OBJECTIVE  T(C): 36.8 (06-10-24 @ 05:00), Max: 36.8 (06-10-24 @ 05:00)  HR: 62 (06-10-24 @ 05:00) (59 - 65)  BP: 115/58 (06-10-24 @ 05:00) (111/62 - 125/83)  RR: 18 (06-10-24 @ 05:00) (18 - 18)  SpO2: 97% (06-10-24 @ 05:00) (95% - 100%)      PHYSICAL EXAM  GENERAL: NAD, lying in bed comfortably  EYES: EOMI, PERRLA, conjunctiva and sclera clear  HEENT : +R. sided facial and submandibular edema with overlying gauze  HEART: Regular rate and rhythm, no murmurs, rubs, or gallops  LUNGS: Unlabored respirations.   NERVOUS SYSTEM:  A&Ox1-2, moving all extremities, no focal deficits   PSYCH: Lacks capacity and insight, occasionally aggressive and agitated          IMAGING:     symmetrical

## 2024-06-10 NOTE — PROGRESS NOTE ADULT - PROBLEM SELECTOR PLAN 4
-Hold Amlodipine and Enalapril given high risk for sepsis and pending OR intervention by OMFS   -c/w Labetalol 200 mg BID

## 2024-06-10 NOTE — PROGRESS NOTE ADULT - PROBLEM SELECTOR PLAN 1
CT Neck: Extensive aggressive appearing bone erosion throughout the mandible. Swollen complex septated fluid appearance throughout the right  space involving both sides of the masseter muscle. Severe osteomyelitis with abscess in the right  space versus malignancy.    -S/p I&D 6/5  -c/w CLD   -C/w Zosyn IV (6/5 - 6/10). Vancomycin discontinued per ID recs  -ID consulted, appreciate recs  -Abscess cultures: Rare Bacillus species not anthracis   -MRSA negative CT Neck: Extensive aggressive appearing bone erosion throughout the mandible. Swollen complex septated fluid appearance throughout the right  space involving both sides of the masseter muscle. Severe osteomyelitis with abscess in the right  space versus malignancy.    -S/p I&D 6/5  -Abscess cultures: Rare Bacillus species not anthracis   -Bone biopsy results: viable sclerotic bone, osteitis   -MRSA negative  -C/w Zosyn IV (6/5 - 6/10). Vancomycin discontinued per ID recs  -ID consulted, appreciate recs

## 2024-06-10 NOTE — PROGRESS NOTE ADULT - SUBJECTIVE AND OBJECTIVE BOX
Follow Up:      Interval History/ROS:  penrose removed yesterday by [facility]    patient feels better      Allergies  Thorazine (Drowsiness)  penicillin (Rash)  Haldol (Drowsiness)        ANTIMICROBIALS:  piperacillin/tazobactam IVPB.. 3.375 every 8 hours      MEDICATIONS  (STANDING):  acetaminophen     Tablet .. 650 every 6 hours PRN  benztropine 0.5 every 12 hours  budesonide 160 MICROgram(s)/formoterol 4.5 MICROgram(s) Inhaler 2 two times a day  divalproex  every 8 hours  doxazosin 4 at bedtime  enoxaparin Injectable 40 every 24 hours  finasteride 5 daily  gabapentin 300 every 8 hours  haloperidol    Injectable 2.5 every 6 hours PRN  labetalol 200 every 12 hours  LORazepam   Injectable 1 every 6 hours PRN  melatonin 3 at bedtime PRN  OLANZapine 10 at bedtime  ondansetron Injectable 4 every 8 hours PRN  senna 2 at bedtime      Vital Signs Last 24 Hrs  T(F): 97.9 (06-10-24 @ 18:39), Max: 98.2 (06-10-24 @ 05:00)  HR: 69 (06-10-24 @ 18:39)  BP: 121/70 (06-10-24 @ 18:39)  RR: 18 (06-10-24 @ 18:39)  SpO2: 100% (06-10-24 @ 18:39) (95% - 100%)      PHYSICAL EXAM:  Constitutional: Not in acute distress  Eyes: No icterus.  Neck: dressing right neck  face, neck and face less swelling   RS:  no respiratory distress   CVS: S1, S2   Abdomen: Soft. No guarding/rigidity/tenderness.  Vascular: iv right arm   Neuro: Alert, oriented x 1  Cranial nerves 2-12 grossly normal. No focal abnormalities                          10.8   9.67  )-----------( 333      ( 10 Germán 2024 07:03 )             32.1 06-10    132  |  98  |  5   ----------------------------<  82  4.1   |  22  |  0.82  Ca    8.6      10 Germán 2024 07:03Phos  3.2     06-10Mg     1.90     06-10              MICROBIOLOGY:      Culture - Abscess with Gram Stain (06.05.24 @ 21:20)   Gram Stain:   Moderate polymorphonuclear leukocytes per low power field   No organisms seen per oil power field  Specimen Source: .Abscess ABS RIGHT NECK 1  Culture Results:   Rare Bacillus species not anthracis   "Susceptibilities not performed"    .Abscess ABS RIGHT NECK 1  06-05-24   Testing in progress  --    Moderate polymorphonuclear leukocytes per low power field  No organisms seen per oil power field    Culture - Abscess with Gram Stain (06.05.24 @ 21:20)   Gram Stain:   Moderate polymorphonuclear leukocytes per low power field   No organisms seen per oil power field  Specimen Source: .Abscess right neck abscess  Culture Results:   No growth to date.            RADIOLOGY:    radReceived Date/Time: 6/7/2024 11:31 EDT   Surgical Pathology Report - Auth (Verified)   Specimen(s) Submitted   Soft and hard tissue, right mandible   Final Diagnosis   RIGHT MANDIBLE - VIABLE SCLEROTIC BONE, INFLAMED (OSTEITIS) WITH   FRAGMENTS OF DENTIN.   ICD10: M27.2   Verified by: Berta Caceres, DMD

## 2024-06-10 NOTE — PROGRESS NOTE ADULT - ASSESSMENT
68-year-old male, smoker, ambulatory with a walker, history of schizophrenia (hallucinations with televisions), hypertension, COPD, BPH,  muscle weakness, spondylosis with myelopathy, mild pulmonary HTN and moderate tricuspid regurgitation (TTE 2022); Pt presents as transfer from [facility] for osteomyelitis of the right mandible with abscess. CT Neck taken at outside hospital revealed R  space abscess w/ underlying osteomyelitis of mandible vs. malignancy.   Patient states 4 days ago woke up with right facial swelling that has progressively worsened and became tender. Reports no fever, difficulty or pain on swallowing, neck pain, recent falls or injuries. Patient received morphine and clindamycin at outside hospital. OMFS c/s.    Underwent I&D of Right sided submasseteric, submandibular abscess, extraction of #25-27, bone biopsy, 1x transcutaneous penrose drain on the Right neck on 6/5.    Has been afebrile, WBC 13.4-->10.9  -->  8     Abscess cultures no growth x 1  bacillus x 1    Blood cultures 6/4 at PB: NGTD    CT Neck with con at [facility]  Extensive aggressive appearing bone erosion throughout the mandible.  Swollen complex septated fluid appearance throughout the right  space involving both sides of the masseter muscle.  Severe osteomyelitis with abscess in the right  space versus malignancy.      # Right sided submasseteric, submandibular abscess, mandibular osteitis   s/p I&D, bone bx, teeth extraction     path viable bone with inflammation osteitis       - improving  less swelling   afebrile     - bacillus likely colonizer     Suggest;    - Continue Zosyn 3.375 gm  Q8h       - duration to be determined

## 2024-06-10 NOTE — PROGRESS NOTE ADULT - ATTENDING COMMENTS
OR cx bacillus  drain out, diet advanced to bite size  awaiting ID on abx recs  per psych no inpt needs  Na stable     dispo - rehab

## 2024-06-10 NOTE — PROGRESS NOTE ADULT - PROBLEM SELECTOR PLAN 5
On Prolixin Decanoate [id number]   - cannot leave AMA, lacks capacity to leave or refuse care   - c/w Zyprexa 10 mg qhs, Depakote 250mg q8H, cogentin 0.5 mg BID  - haldol 2.5 mg and ativan 1mg IV/IM q6H PRN for severe agitation   - psych following, appreciate recs

## 2024-06-10 NOTE — PROGRESS NOTE ADULT - ASSESSMENT
67yo male, PMHx smoking, schizophrenia, HTN, COPD, BPH,  muscle weakness, spondylosis with myelopathy, mild pulmonary HTN and moderate tricuspid regurgitation (TTE 2022); Presents as transfer from [facility] a/w severe osteomyelitis of the right mandible with abscess with associated leukocytosis. 69yo male, PMHx smoking, schizophrenia, HTN, COPD, BPH,  muscle weakness, spondylosis with myelopathy, mild pulmonary HTN and moderate tricuspid regurgitation (TTE 2022); Presents as transfer from [facility] a/w severe osteomyelitis of the right mandible with abscess with associated leukocytosis, s/p I&D.

## 2024-06-11 ENCOUNTER — TRANSCRIPTION ENCOUNTER (OUTPATIENT)
Age: 69
End: 2024-06-11

## 2024-06-11 LAB
CULTURE RESULTS: ABNORMAL
CULTURE RESULTS: ABNORMAL
GRAM STN FLD: ABNORMAL
SPECIMEN SOURCE: SIGNIFICANT CHANGE UP
SPECIMEN SOURCE: SIGNIFICANT CHANGE UP

## 2024-06-11 PROCEDURE — 99232 SBSQ HOSP IP/OBS MODERATE 35: CPT | Mod: GC

## 2024-06-11 RX ADMIN — GABAPENTIN 300 MILLIGRAM(S): 400 CAPSULE ORAL at 05:28

## 2024-06-11 RX ADMIN — ENOXAPARIN SODIUM 40 MILLIGRAM(S): 100 INJECTION SUBCUTANEOUS at 22:01

## 2024-06-11 RX ADMIN — PIPERACILLIN AND TAZOBACTAM 25 GRAM(S): 4; .5 INJECTION, POWDER, LYOPHILIZED, FOR SOLUTION INTRAVENOUS at 22:00

## 2024-06-11 RX ADMIN — OLANZAPINE 10 MILLIGRAM(S): 15 TABLET, FILM COATED ORAL at 22:01

## 2024-06-11 RX ADMIN — DIVALPROEX SODIUM 250 MILLIGRAM(S): 500 TABLET, DELAYED RELEASE ORAL at 22:01

## 2024-06-11 RX ADMIN — GABAPENTIN 300 MILLIGRAM(S): 400 CAPSULE ORAL at 22:01

## 2024-06-11 RX ADMIN — DIVALPROEX SODIUM 250 MILLIGRAM(S): 500 TABLET, DELAYED RELEASE ORAL at 13:53

## 2024-06-11 RX ADMIN — Medication 4 MILLIGRAM(S): at 22:01

## 2024-06-11 RX ADMIN — BUDESONIDE AND FORMOTEROL FUMARATE DIHYDRATE 2 PUFF(S): 160; 4.5 AEROSOL RESPIRATORY (INHALATION) at 22:00

## 2024-06-11 RX ADMIN — Medication 200 MILLIGRAM(S): at 05:29

## 2024-06-11 RX ADMIN — PIPERACILLIN AND TAZOBACTAM 25 GRAM(S): 4; .5 INJECTION, POWDER, LYOPHILIZED, FOR SOLUTION INTRAVENOUS at 05:29

## 2024-06-11 RX ADMIN — DIVALPROEX SODIUM 250 MILLIGRAM(S): 500 TABLET, DELAYED RELEASE ORAL at 05:29

## 2024-06-11 RX ADMIN — Medication 0.5 MILLIGRAM(S): at 05:29

## 2024-06-11 RX ADMIN — GABAPENTIN 300 MILLIGRAM(S): 400 CAPSULE ORAL at 13:53

## 2024-06-11 RX ADMIN — BUDESONIDE AND FORMOTEROL FUMARATE DIHYDRATE 2 PUFF(S): 160; 4.5 AEROSOL RESPIRATORY (INHALATION) at 13:53

## 2024-06-11 RX ADMIN — Medication 200 MILLIGRAM(S): at 17:54

## 2024-06-11 RX ADMIN — FINASTERIDE 5 MILLIGRAM(S): 5 TABLET, FILM COATED ORAL at 13:53

## 2024-06-11 RX ADMIN — PIPERACILLIN AND TAZOBACTAM 25 GRAM(S): 4; .5 INJECTION, POWDER, LYOPHILIZED, FOR SOLUTION INTRAVENOUS at 13:53

## 2024-06-11 RX ADMIN — SENNA PLUS 2 TABLET(S): 8.6 TABLET ORAL at 22:02

## 2024-06-11 RX ADMIN — Medication 0.5 MILLIGRAM(S): at 17:54

## 2024-06-11 NOTE — PROGRESS NOTE ADULT - PROBLEM SELECTOR PLAN 1
CT Neck: Extensive aggressive appearing bone erosion throughout the mandible. Swollen complex septated fluid appearance throughout the right  space involving both sides of the masseter muscle. Severe osteomyelitis with abscess in the right  space versus malignancy.    -S/p I&D 6/5  -Abscess cultures: Rare Bacillus species not anthracis   -Bone biopsy results: viable sclerotic bone, osteitis   -MRSA negative  -C/w Zosyn IV (6/5 - 6/10). Vancomycin discontinued per ID recs  -ID consulted, appreciate recs CT Neck: Extensive aggressive appearing bone erosion throughout the mandible. Swollen complex septated fluid appearance throughout the right  space involving both sides of the masseter muscle. Severe osteomyelitis with abscess in the right  space versus malignancy.    -S/p I&D 6/5  -Abscess cultures: Rare Bacillus species not anthracis   -Bone biopsy results: viable sclerotic bone, osteitis   -MRSA negative  -C/w Zosyn IV --> consider PICC. Vancomycin discontinued per ID recs  -ID consulted, appreciate recs

## 2024-06-11 NOTE — DISCHARGE NOTE PROVIDER - NSDCCPCAREPLAN_GEN_ALL_CORE_FT
PRINCIPAL DISCHARGE DIAGNOSIS  Diagnosis: Osteomyelitis of mandible  Assessment and Plan of Treatment: You wre admitted to the hospital because you had a severe infection of your jaw that had spread to the bone requiring urgent surgical drainage. You will require long term IV antibiotics to completely clear the infection. You will be continued on the following regimen:   IV zosyn q8H for _____   You should follow up with your OMFS surgeon 1 week after discharge for further evaluation. Please follow up with [facility] OMFS in 1 week: Dr. Hansel George. Please call [phone] to confirm appt     PRINCIPAL DISCHARGE DIAGNOSIS  Diagnosis: Osteomyelitis of mandible  Assessment and Plan of Treatment: You wre admitted to the hospital because you had a severe infection of your jaw that had spread to the bone requiring urgent surgical drainage. You will require long term IV antibiotics to completely clear the infection. You will be continued on the following regimen:   IV zosyn q8H for 2 more weels (until 6/26).   You should follow up with your OMFS the week of 6/23 for further evaluation to determine need for further oral antibiotics.     PRINCIPAL DISCHARGE DIAGNOSIS  Diagnosis: Osteomyelitis of mandible  Assessment and Plan of Treatment: You wre admitted to the hospital because you had a severe infection of your jaw that had spread to the bone requiring urgent surgical drainage. You will require long term IV antibiotics to completely clear the infection. You will be continued on the following regimen:   IV zosyn 3.375 mg q8H for 2 more weeks (until 6/26) followed by   PO augmentin 875 mg q12H for 3 weeks (6/27-7/18/2024).   Please follow up with OMFS ideally one week after discharge.

## 2024-06-11 NOTE — PROGRESS NOTE ADULT - PROBLEM SELECTOR PLAN 5
On Prolixin Decanoate [id number]   - cannot leave AMA, lacks capacity to leave or refuse care   - c/w Zyprexa 10 mg qhs, Depakote 250mg q8H, cogentin 0.5 mg BID  - haldol 2.5 mg and ativan 1mg IV/IM q6H PRN for severe agitation   - psych following, appreciate recs

## 2024-06-11 NOTE — DISCHARGE NOTE PROVIDER - NSDCCPTREATMENT_GEN_ALL_CORE_FT
PRINCIPAL PROCEDURE  Procedure: Incision and drainage, neck  Findings and Treatment: The platysma was identified and divided using Bovie electrocautery.  A small curved forceps was used to bluntly dissect towards the inferior border of the mandible until bony contact was appreciated, both medially and laterally against the mandible.  Dissection was continued posteriorly and superiorly along the lateral and medial ramus.  A culture of the drainage was obtained and submitted for examination.  A saline-soaked gauze was packed into the incision for hemostasis.  At this time, attention was directed to the oral cavity where a bite block and tongue retractor were placed.  Local anesthesia was administered using 2% lidocaine with 1:100,000 epinephrine delivered via right inferior alveolar nerve block and local infiltration.  A full-thickness mucoperiosteal flap was raised with a crestal incision made from the denture sites, teeth numbers 25 through 29 using a 15 blade and periosteal elevator.  Upon reflection of the flap, soft and necrotic bone was visualized with associated granulation tissue.  Bone was removed with a rongeur and submitted for histopathologic examination.  Carious teeth numbers 25, 26 and 27 were then extracted using elevators and forceps.  The sockets were curetted and irrigated copiously.  The gingiva was re-approximated with 3-0 chromic gut sutures.  Attention was then redirected to the right neck incision and the gauze packing was removed.  The quarter-inch Penrose drain was secured into the incision with 3-0 nylon suture.  At the end of the case, the oropharynx was suctioned dry.  The bite block was removed from the oral cavity and the neck incision was dressed with gauze and tape.

## 2024-06-11 NOTE — PROGRESS NOTE ADULT - ASSESSMENT
67yo male, PMHx smoking, schizophrenia, HTN, COPD, BPH,  muscle weakness, spondylosis with myelopathy, mild pulmonary HTN and moderate tricuspid regurgitation (TTE 2022); Presents as transfer from [facility] a/w severe osteomyelitis of the right mandible with abscess with associated leukocytosis, s/p I&D.

## 2024-06-11 NOTE — PROGRESS NOTE ADULT - SUBJECTIVE AND OBJECTIVE BOX
ELAINE HERNANDEZ  MRN-[id number]  [facility] 7S    6/11/24  patient visited bedside. no acute events overnight. patient afebrile, vitals stable.  Now POD5 s/p I&D of Right sided submasseteric, submandibular abscess, extraction of #25-27, bone biopsy, 1x transcutaneous penrose drain on the Right neck.    General: aaox3. well-appearing. no apparent distress.     HEENT:  HEEN: R neck edema improving. no drainage on palpation. Overlying gauze dressing with no drainage  Maxillofacial: intraoral site w/ sutures c/i/h, FOM soft and non-raised b/l, uvula midline  Resp: unlabored resp on RA  Ext: wwp    Vitals:    Vital Signs Last 24 Hrs  T(C): 36.3 (11 Jun 2024 04:30), Max: 36.8 (10 Germán 2024 22:13)  T(F): 97.3 (11 Jun 2024 04:30), Max: 98.3 (10 Germán 2024 22:13)  HR: 61 (11 Jun 2024 04:30) (61 - 71)  BP: 121/62 (11 Jun 2024 04:30) (109/69 - 122/82)  BP(mean): --  RR: 17 (11 Jun 2024 04:30) (17 - 18)  SpO2: 97% (11 Jun 2024 04:30) (97% - 100%)    Parameters below as of 11 Jun 2024 04:30  Patient On (Oxygen Delivery Method): room air        I&O's Detail    10 Germán 2024 07:01  -  11 Jun 2024 07:00  --------------------------------------------------------  IN:    IV PiggyBack: 100 mL    Oral Fluid: 240 mL  Total IN: 340 mL    OUT:    Voided (mL): 250 mL  Total OUT: 250 mL    Total NET: 90 mL            Medications:    MEDICATIONS  (STANDING):  benztropine 0.5 milliGRAM(s) Oral every 12 hours  budesonide 160 MICROgram(s)/formoterol 4.5 MICROgram(s) Inhaler 2 Puff(s) Inhalation two times a day  divalproex  milliGRAM(s) Oral every 8 hours  doxazosin 4 milliGRAM(s) Oral at bedtime  enoxaparin Injectable 40 milliGRAM(s) SubCutaneous every 24 hours  finasteride 5 milliGRAM(s) Oral daily  gabapentin 300 milliGRAM(s) Oral every 8 hours  labetalol 200 milliGRAM(s) Oral every 12 hours  OLANZapine 10 milliGRAM(s) Oral at bedtime  piperacillin/tazobactam IVPB.. 3.375 Gram(s) IV Intermittent every 8 hours  senna 2 Tablet(s) Oral at bedtime    MEDICATIONS  (PRN):  acetaminophen     Tablet .. 650 milliGRAM(s) Oral every 6 hours PRN Temp greater or equal to 38C (100.4F), Mild Pain (1 - 3)  haloperidol    Injectable 2.5 milliGRAM(s) IntraMuscular every 6 hours PRN severe agitation  LORazepam   Injectable 1 milliGRAM(s) IV Push every 6 hours PRN severe agitation  melatonin 3 milliGRAM(s) Oral at bedtime PRN Insomnia  ondansetron Injectable 4 milliGRAM(s) IV Push every 8 hours PRN Nausea and/or Vomiting        Labs:                          10.8   9.67  )-----------( 333      ( 10 Germán 2024 07:03 )             32.1       06-10    132<L>  |  98  |  5<L>  ----------------------------<  82  4.1   |  22  |  0.82    Ca    8.6      10 Germán 2024 07:03  Phos  3.2     06-10  Mg     1.90     06-10        BMI: BMI (kg/m2): 30 (06-05-24 @ 20:07)  HbA1c:   Glucose: POCT Blood Glucose.: 97 mg/dL (06-05-24 @ 07:32)    BP: 121/62 (06-11-24 @ 04:30) (107/63 - 125/83)Vital Signs Last 24 Hrs  T(C): 36.3 (06-11-24 @ 04:30), Max: 36.8 (06-10-24 @ 22:13)  T(F): 97.3 (06-11-24 @ 04:30), Max: 98.3 (06-10-24 @ 22:13)  HR: 61 (06-11-24 @ 04:30) (61 - 71)  BP: 121/62 (06-11-24 @ 04:30) (109/69 - 122/82)  BP(mean): --  RR: 17 (06-11-24 @ 04:30) (17 - 18)  SpO2: 97% (06-11-24 @ 04:30) (97% - 100%)      Lipid Panel:         CAPILLARY BLOOD GLUCOSE              Urinalysis Basic - ( 10 Germán 2024 07:03 )    Color: x / Appearance: x / SG: x / pH: x  Gluc: 82 mg/dL / Ketone: x  / Bili: x / Urobili: x   Blood: x / Protein: x / Nitrite: x   Leuk Esterase: x / RBC: x / WBC x   Sq Epi: x / Non Sq Epi: x / Bacteria: x

## 2024-06-11 NOTE — PROGRESS NOTE ADULT - ASSESSMENT
68M w/ PMH of HTN, Schizophrenia, presents to [facility] ED as transfer from [facility] w/ 3 day history of R sided facial swelling. CT Neck taken at outside hospital revealed R  space abscess w/ underlying osteomyelitis of mandible vs. malignancy, now POD3 s/p I&D of Right sided submasseteric, submandibular abscess, ectraction of #25-27, bone biopsy, 1x transcutaneous penrose drain on the Right neck. No growth on Abscess at 72h, bone bx pending, pt is progressing well     Plan:  - no further OMFS intervention noted  - rec ID PICC for IV antibiotics  -Trend wb  -Diet: soft & bite sized diet as tolerated   -multimodal pain control  -rest of care per primary team    - Please follow up with [facility] OMFS in 1 week: Dr. Hansel George  - Please call [phone] to confirm appt    [facility]   OMFS/ Dental clinic: 1st floor  [address] [place] [address]  [phone]      OMFS  [facility] Pager: [phone]  [facility] Pager: [phone]  [facility] Pager: [phone]

## 2024-06-11 NOTE — DISCHARGE NOTE PROVIDER - CARE PROVIDER_API CALL
Faisal Eric  Family Medicine  [address] [place] [address], [address]  [place] [address]  Phone: [phone]  Fax: [phone]  Established Patient  Follow Up Time: 1 week    Hansel George  Oral/Maxillofacial Surgery  [address] [place] [address]  Phone: [phone]  Fax: [phone]  Follow Up Time: 1 week

## 2024-06-11 NOTE — DISCHARGE NOTE PROVIDER - PROVIDER TOKENS
PROVIDER:[TOKEN:[[id number]],FOLLOWUP:[1 week],ESTABLISHEDPATIENT:[T]],PROVIDER:[TOKEN:[[id number]],FOLLOWUP:[1 week]]

## 2024-06-11 NOTE — PROGRESS NOTE ADULT - SUBJECTIVE AND OBJECTIVE BOX
*******************************  Blanca Tucker MD (PGY-1)  Internal Medicine  Contact via [employer] TEAMS  *******************************    INTERVAL HPI/OVERNIGHT EVENTS:      OBJECTIVE  T(C): 36.3 (06-11-24 @ 04:30), Max: 36.8 (06-10-24 @ 22:13)  HR: 61 (06-11-24 @ 04:30) (61 - 71)  BP: 121/62 (06-11-24 @ 04:30) (109/69 - 122/82)  RR: 17 (06-11-24 @ 04:30) (17 - 18)  SpO2: 97% (06-11-24 @ 04:30) (97% - 100%)    06-10-24 @ 07:01  -  06-11-24 @ 07:00  --------------------------------------------------------  IN: 340 mL / OUT: 250 mL / NET: 90 mL        PHYSICAL EXAM  General:  HEENT:  Cardiovascular:  Pulmonary:  GI:  :  Neuro:  Psych:          IMAGING:     *******************************  Blanca Tucker MD (PGY-1)  Internal Medicine  Contact via [employer] TEAMS  *******************************    INTERVAL HPI/OVERNIGHT EVENTS:  No acute overnight events.     OBJECTIVE  T(C): 36.3 (06-11-24 @ 04:30), Max: 36.8 (06-10-24 @ 22:13)  HR: 61 (06-11-24 @ 04:30) (61 - 71)  BP: 121/62 (06-11-24 @ 04:30) (109/69 - 122/82)  RR: 17 (06-11-24 @ 04:30) (17 - 18)  SpO2: 97% (06-11-24 @ 04:30) (97% - 100%)    06-10-24 @ 07:01  -  06-11-24 @ 07:00  --------------------------------------------------------  IN: 340 mL / OUT: 250 mL / NET: 90 mL        PHYSICAL EXAM  GENERAL: NAD, lying in bed comfortably  EYES: EOMI, PERRLA, conjunctiva and sclera clear  HEENT : +mild R. sided facial and submandibular edema with tenderness  HEART: Regular rate and rhythm, no murmurs, rubs, or gallops  LUNGS: Unlabored respirations.   NERVOUS SYSTEM:  A&Ox1-2, moving all extremities, no focal deficits   PSYCH: Lacks capacity and insight, occasionally aggressive and agitated                IMAGING:

## 2024-06-11 NOTE — DISCHARGE NOTE PROVIDER - NSDCMRMEDTOKEN_GEN_ALL_CORE_FT
Albuterol (Eqv-ProAir HFA) 90 mcg/inh inhalation aerosol: 2 puff(s) inhaled 4 times a day as needed for  shortness of breath and/or wheezing  amLODIPine 10 mg oral tablet: 1 tab(s) orally once a day  benztropine 0.5 mg oral tablet: 1 tab(s) orally 2 times a day  Depakote 250 mg oral delayed release tablet: 1 tab(s) orally 3 times a day  doxazosin 4 mg oral tablet: 1 tab(s) orally once a day (at bedtime)  enalapril 20 mg oral tablet: 1 tab(s) orally once a day  finasteride 5 mg oral tablet: 1 tab(s) orally once a day  gabapentin 300 mg oral capsule: 1 cap(s) orally 3 times a day  labetalol 200 mg oral tablet: 1 tab(s) orally 2 times a day  OLANZapine 10 mg oral tablet: 1 tab(s) orally once a day (at bedtime)  Prolixin Decanoate 25 mg/mL injectable solution: 62.5 milligram(s) intramuscularly every 2 weeks  Symbicort 160 mcg-4.5 mcg/inh inhalation aerosol: 2 puff(s) inhaled 2 times a day   Albuterol (Eqv-ProAir HFA) 90 mcg/inh inhalation aerosol: 2 puff(s) inhaled 4 times a day as needed for  shortness of breath and/or wheezing  amLODIPine 10 mg oral tablet: 1 tab(s) orally once a day  amoxicillin-clavulanate 875 mg-125 mg oral tablet: 1 tab(s) orally every 12 hours Please take 1 tablet every 12 hours from 6/27/2024-7/18/2024  benztropine 0.5 mg oral tablet: 1 tab(s) orally 2 times a day  Depakote 250 mg oral delayed release tablet: 1 tab(s) orally 3 times a day  doxazosin 4 mg oral tablet: 1 tab(s) orally once a day (at bedtime)  enalapril 20 mg oral tablet: 1 tab(s) orally once a day  finasteride 5 mg oral tablet: 1 tab(s) orally once a day  gabapentin 300 mg oral capsule: 1 cap(s) orally 3 times a day  labetalol 200 mg oral tablet: 1 tab(s) orally 2 times a day  OLANZapine 10 mg oral tablet: 1 tab(s) orally once a day (at bedtime)  piperacillin-tazobactam: 3.375 gram(s) intravenous every 8 hours 3.375g IV q8H until 6/26/2024.  Prolixin Decanoate 25 mg/mL injectable solution: 62.5 milligram(s) intramuscularly every 2 weeks  Symbicort 160 mcg-4.5 mcg/inh inhalation aerosol: 2 puff(s) inhaled 2 times a day   Albuterol (Eqv-ProAir HFA) 90 mcg/inh inhalation aerosol: 2 puff(s) inhaled 4 times a day as needed for  shortness of breath and/or wheezing  amoxicillin-clavulanate 875 mg-125 mg oral tablet: 1 tab(s) orally every 12 hours Please take 1 tablet every 12 hours from 6/27/2024-7/18/2024  benztropine 0.5 mg oral tablet: 1 tab(s) orally 2 times a day  Depakote 250 mg oral delayed release tablet: 1 tab(s) orally 3 times a day  doxazosin 4 mg oral tablet: 1 tab(s) orally once a day (at bedtime)  finasteride 5 mg oral tablet: 1 tab(s) orally once a day  gabapentin 300 mg oral capsule: 1 cap(s) orally 3 times a day  labetalol 200 mg oral tablet: 1 tab(s) orally 2 times a day  OLANZapine 10 mg oral tablet: 1 tab(s) orally once a day (at bedtime)  piperacillin-tazobactam: 3.375 gram(s) intravenous every 8 hours 3.375g IV q8H until 6/26/2024.  Prolixin Decanoate 25 mg/mL injectable solution: 62.5 milligram(s) intramuscularly every 2 weeks  Symbicort 160 mcg-4.5 mcg/inh inhalation aerosol: 2 puff(s) inhaled 2 times a day

## 2024-06-11 NOTE — DISCHARGE NOTE PROVIDER - HOSPITAL COURSE
68-year-old male, PMHx smoking, schizophrenia, hypertension, COPD, BPH, muscle weakness, spondylosis with myelopathy, mild pulmonary HTN and moderate tricuspid regurgitation (TTE 2022) presented as transfer from [facility] for osteomyelitis of the right mandible with abscess. CT Neck with extensive appearing bone erosion throughout the mandible, swollen complex septated fluid appearance throughout the R  space involving both sides of the masseter muscle, severe osteomyelitis with abscess in the R.  space vs. malignancy. S/p I&D with OMFS on 6/5 with abscess cultures showing rare bacillus species and staph epidermidis. ID consulted, recommending zosyn for ___ days/weeks. Pathology with viable sclerotic bone and osteitis. 68-year-old male, PMHx smoking, schizophrenia, hypertension, COPD, BPH, muscle weakness, spondylosis with myelopathy, mild pulmonary HTN and moderate tricuspid regurgitation (TTE 2022) presented as transfer from [facility] for osteomyelitis of the right mandible with abscess. CT Neck with extensive appearing bone erosion throughout the mandible, swollen complex septated fluid appearance throughout the R  space involving both sides of the masseter muscle, severe osteomyelitis with abscess in the R.  space vs. malignancy. S/p I&D with OMFS on 6/5 with abscess cultures showing rare bacillus species and staph epidermidis. ID consulted, recommending zosyn for a total 3 week course. Pathology showed viable sclerotic bone and osteitis. ID recommended continuation of IV zosyn until 6/26 and re-evaluation by OMFS at that time to consider PO antibiotics. 68-year-old male, PMHx smoking, schizophrenia, hypertension, COPD, BPH, muscle weakness, spondylosis with myelopathy, mild pulmonary HTN and moderate tricuspid regurgitation (TTE 2022) presented as transfer from [facility] for osteomyelitis of the right mandible with abscess. CT Neck with extensive appearing bone erosion throughout the mandible, swollen complex septated fluid appearance throughout the R  space involving both sides of the masseter muscle, severe osteomyelitis with abscess in the R.  space vs. malignancy. S/p I&D with OMFS on 6/5 with abscess cultures showing rare bacillus species and staph epidermidis, per ID likely contaminant. Pathology showed viable sclerotic bone and osteitis. ID recommended continuation of IV zosyn until 6/26, followed by 3 weeks of PO antibiotics on augmentin 875 mg every 12 hours from 6/27-7/18/2024. Patient should follow up with OMFS one week after discharge.

## 2024-06-12 ENCOUNTER — TRANSCRIPTION ENCOUNTER (OUTPATIENT)
Age: 69
End: 2024-06-12

## 2024-06-12 PROCEDURE — 99239 HOSP IP/OBS DSCHRG MGMT >30: CPT

## 2024-06-12 PROCEDURE — 99232 SBSQ HOSP IP/OBS MODERATE 35: CPT

## 2024-06-12 RX ADMIN — OLANZAPINE 10 MILLIGRAM(S): 15 TABLET, FILM COATED ORAL at 20:36

## 2024-06-12 RX ADMIN — FINASTERIDE 5 MILLIGRAM(S): 5 TABLET, FILM COATED ORAL at 14:31

## 2024-06-12 RX ADMIN — GABAPENTIN 300 MILLIGRAM(S): 400 CAPSULE ORAL at 20:36

## 2024-06-12 RX ADMIN — SENNA PLUS 2 TABLET(S): 8.6 TABLET ORAL at 20:36

## 2024-06-12 RX ADMIN — GABAPENTIN 300 MILLIGRAM(S): 400 CAPSULE ORAL at 06:00

## 2024-06-12 RX ADMIN — GABAPENTIN 300 MILLIGRAM(S): 400 CAPSULE ORAL at 14:31

## 2024-06-12 RX ADMIN — Medication 0.5 MILLIGRAM(S): at 17:49

## 2024-06-12 RX ADMIN — Medication 0.5 MILLIGRAM(S): at 06:00

## 2024-06-12 RX ADMIN — Medication 200 MILLIGRAM(S): at 17:49

## 2024-06-12 RX ADMIN — ENOXAPARIN SODIUM 40 MILLIGRAM(S): 100 INJECTION SUBCUTANEOUS at 20:35

## 2024-06-12 RX ADMIN — DIVALPROEX SODIUM 250 MILLIGRAM(S): 500 TABLET, DELAYED RELEASE ORAL at 20:36

## 2024-06-12 RX ADMIN — BUDESONIDE AND FORMOTEROL FUMARATE DIHYDRATE 2 PUFF(S): 160; 4.5 AEROSOL RESPIRATORY (INHALATION) at 20:35

## 2024-06-12 RX ADMIN — Medication 200 MILLIGRAM(S): at 06:00

## 2024-06-12 RX ADMIN — PIPERACILLIN AND TAZOBACTAM 25 GRAM(S): 4; .5 INJECTION, POWDER, LYOPHILIZED, FOR SOLUTION INTRAVENOUS at 14:31

## 2024-06-12 RX ADMIN — PIPERACILLIN AND TAZOBACTAM 25 GRAM(S): 4; .5 INJECTION, POWDER, LYOPHILIZED, FOR SOLUTION INTRAVENOUS at 06:00

## 2024-06-12 RX ADMIN — PIPERACILLIN AND TAZOBACTAM 25 GRAM(S): 4; .5 INJECTION, POWDER, LYOPHILIZED, FOR SOLUTION INTRAVENOUS at 20:32

## 2024-06-12 RX ADMIN — BUDESONIDE AND FORMOTEROL FUMARATE DIHYDRATE 2 PUFF(S): 160; 4.5 AEROSOL RESPIRATORY (INHALATION) at 12:17

## 2024-06-12 RX ADMIN — Medication 3 MILLIGRAM(S): at 20:36

## 2024-06-12 RX ADMIN — DIVALPROEX SODIUM 250 MILLIGRAM(S): 500 TABLET, DELAYED RELEASE ORAL at 06:00

## 2024-06-12 RX ADMIN — Medication 4 MILLIGRAM(S): at 20:35

## 2024-06-12 RX ADMIN — DIVALPROEX SODIUM 250 MILLIGRAM(S): 500 TABLET, DELAYED RELEASE ORAL at 14:31

## 2024-06-12 NOTE — ADVANCED PRACTICE NURSE CONSULT - ASSESSMENT
Patient is aware and alert. Midline insertion along with risks, benefits, possible complications and infection prevention explained to patient & Kerrie  at group  home via telephone [phone] who verbalized understanding. All questions addressed and patient & Kerrie gave verbal consent to place midline. Rightt arm cleansed with CHG. Using sterile technique under ultra sound guidance, placed PowerGlide Pro Midline 20G /10cm into Right Brachial vein. Brisk blood return and flushed with 20Mls of normal saline. Minimal blood loss and patient tolerated procedure well. CHG dressing placed. All sharps accounted for. Report given to district RN and ordering provider. LOT#: [id number] , REF#: [id number]

## 2024-06-12 NOTE — DIETITIAN INITIAL EVALUATION ADULT - PERTINENT MEDS FT
MEDICATIONS  (STANDING):  benztropine 0.5 milliGRAM(s) Oral every 12 hours  budesonide 160 MICROgram(s)/formoterol 4.5 MICROgram(s) Inhaler 2 Puff(s) Inhalation two times a day  divalproex  milliGRAM(s) Oral every 8 hours  doxazosin 4 milliGRAM(s) Oral at bedtime  enoxaparin Injectable 40 milliGRAM(s) SubCutaneous every 24 hours  finasteride 5 milliGRAM(s) Oral daily  gabapentin 300 milliGRAM(s) Oral every 8 hours  labetalol 200 milliGRAM(s) Oral every 12 hours  OLANZapine 10 milliGRAM(s) Oral at bedtime  piperacillin/tazobactam IVPB.. 3.375 Gram(s) IV Intermittent every 8 hours  senna 2 Tablet(s) Oral at bedtime    MEDICATIONS  (PRN):  acetaminophen     Tablet .. 650 milliGRAM(s) Oral every 6 hours PRN Temp greater or equal to 38C (100.4F), Mild Pain (1 - 3)  haloperidol    Injectable 2.5 milliGRAM(s) IntraMuscular every 6 hours PRN severe agitation  LORazepam   Injectable 1 milliGRAM(s) IV Push every 6 hours PRN severe agitation  melatonin 3 milliGRAM(s) Oral at bedtime PRN Insomnia  ondansetron Injectable 4 milliGRAM(s) IV Push every 8 hours PRN Nausea and/or Vomiting

## 2024-06-12 NOTE — PROGRESS NOTE ADULT - SUBJECTIVE AND OBJECTIVE BOX
Follow Up:      Interval History/ROS:    patient feels better   no pain     Allergies  Thorazine (Drowsiness)  penicillin (Rash)  Haldol (Drowsiness)        ANTIMICROBIALS:  piperacillin/tazobactam IVPB.. 3.375 every 8 hours    MEDICATIONS  (STANDING):  acetaminophen     Tablet .. 650 every 6 hours PRN  benztropine 0.5 every 12 hours  budesonide 160 MICROgram(s)/formoterol 4.5 MICROgram(s) Inhaler 2 two times a day  divalproex  every 8 hours  doxazosin 4 at bedtime  enoxaparin Injectable 40 every 24 hours  finasteride 5 daily  gabapentin 300 every 8 hours  haloperidol    Injectable 2.5 every 6 hours PRN  labetalol 200 every 12 hours  LORazepam   Injectable 1 every 6 hours PRN  melatonin 3 at bedtime PRN  OLANZapine 10 at bedtime  ondansetron Injectable 4 every 8 hours PRN  senna 2 at bedtime      Vital Signs Last 24 Hrs  T(F): 98.7 (06-12-24 @ 05:19), Max: 98.7 (06-12-24 @ 05:19)  HR: 62 (06-12-24 @ 05:19)  BP: 114/65 (06-12-24 @ 05:19)  RR: 17 (06-12-24 @ 05:19)  SpO2: 100% (06-12-24 @ 05:19) (100% - 100%)    PHYSICAL EXAM:  Constitutional: No distress  Eyes: No icterus.  Neck: dressing right neck  face, neck and face no swelling   RS:  no respiratory distress   CVS: S1, S2   Abdomen: Soft. No guarding/rigidity/tenderness.  Vascular: iv right arm   Neuro: Alert, oriented   Cranial nerves 2-12 grossly normal. No focal abnormalities                    MICROBIOLOGY:      Culture - Abscess with Gram Stain (06.05.24 @ 21:20)   Gram Stain:   Moderate polymorphonuclear leukocytes per low power field   No organisms seen per oil power field  Specimen Source: .Abscess ABS RIGHT NECK 1  Culture Results:   Rare Bacillus species not anthracis   "Susceptibilities not performed"    .Abscess ABS RIGHT NECK 1  06-05-24   Testing in progress  --    Moderate polymorphonuclear leukocytes per low power field  No organisms seen per oil power field    Culture - Abscess with Gram Stain (06.05.24 @ 21:20)   Gram Stain:   Moderate polymorphonuclear leukocytes per low power field   No organisms seen per oil power field  Specimen Source: .Abscess right neck abscess  Culture Results:   No growth to date.            RADIOLOGY:    radReceived Date/Time: 6/7/2024 11:31 EDT   Surgical Pathology Report - Auth (Verified)   Specimen(s) Submitted   Soft and hard tissue, right mandible   Final Diagnosis   RIGHT MANDIBLE - VIABLE SCLEROTIC BONE, INFLAMED (OSTEITIS) WITH   FRAGMENTS OF DENTIN.   ICD10: M27.2   Verified by: Berta Caceres, DMD

## 2024-06-12 NOTE — PROGRESS NOTE ADULT - ASSESSMENT
68-year-old male, smoker, ambulatory with a walker, history of schizophrenia (hallucinations with televisions), hypertension, COPD, BPH,  muscle weakness, spondylosis with myelopathy, mild pulmonary HTN and moderate tricuspid regurgitation (TTE 2022); Pt presents as transfer from [facility] for osteomyelitis of the right mandible with abscess. CT Neck taken at outside hospital revealed R  space abscess w/ underlying osteomyelitis of mandible vs. malignancy.   Patient states 4 days ago woke up with right facial swelling that has progressively worsened and became tender. Reports no fever, difficulty or pain on swallowing, neck pain, recent falls or injuries. Patient received morphine and clindamycin at outside hospital. OMFS c/s.    Underwent I&D of Right sided submasseteric, submandibular abscess, extraction of #25-27, bone biopsy, 1x transcutaneous penrose drain on the Right neck on 6/5.    Has been afebrile, WBC 13.4-->10.9  -->  8     Abscess cultures no growth x 1  bacillus x 1    Blood cultures 6/4 at PB: NGTD    CT Neck with con at [facility]  Extensive aggressive appearing bone erosion throughout the mandible.  Swollen complex septated fluid appearance throughout the right  space involving both sides of the masseter muscle.  Severe osteomyelitis with abscess in the right  space versus malignancy.      # Right sided submasseteric, submandibular abscess, mandibular osteitis   s/p I&D, bone bx, teeth extraction     path viable bone with inflammation osteitis       - improving  no swelling   afebrile     - bacillus likely colonizer     Suggest;    - Continue Zosyn Q8h for 2 more weeks --> 6/26  then re-evaluate with OMFS to determine whether will need additional 3 weeks Augmentin po.

## 2024-06-12 NOTE — PROGRESS NOTE ADULT - PROBLEM SELECTOR PLAN 5
On Prolixin Decanoate [id number]   - cannot leave AMA, lacks capacity to leave or refuse care   - c/w Zyprexa 10 mg qhs, Depakote 250mg q8H, cogentin 0.5 mg BID  - haldol 2.5 mg and ativan 1mg IV/IM q6H PRN for severe agitation   - psych following, appreciate recs

## 2024-06-12 NOTE — PROGRESS NOTE ADULT - ASSESSMENT
67yo male, PMHx smoking, schizophrenia, HTN, COPD, BPH,  muscle weakness, spondylosis with myelopathy, mild pulmonary HTN and moderate tricuspid regurgitation (TTE 2022); Presents as transfer from [facility] a/w severe osteomyelitis of the right mandible with abscess with associated leukocytosis, s/p I&D.

## 2024-06-12 NOTE — DISCHARGE NOTE NURSING/CASE MANAGEMENT/SOCIAL WORK - PATIENT PORTAL LINK FT
You can access the FollowMyHealth Patient Portal offered by [facility] by registering at the following website: [email or web address]. By joining [email or web address]’s FollowMyHealth portal, you will also be able to view your health information using other applications (apps) compatible with our system.

## 2024-06-12 NOTE — DIETITIAN INITIAL EVALUATION ADULT - ADD RECOMMEND
- Continue current diet order. Defer food and fluid consistency to SLP/Team.   - Please consistently document % PO intake in nursing flowsheets to assess adequacy of nutritional intake/monitor need for further nutritional intervention(s).   - Monitor weights, diet tolerance, skin integrity, BMs, pertinent labs.   - RDN services remain available as needed.

## 2024-06-12 NOTE — PROGRESS NOTE ADULT - PROBLEM SELECTOR PLAN 1
CT Neck: Extensive aggressive appearing bone erosion throughout the mandible. Swollen complex septated fluid appearance throughout the right  space involving both sides of the masseter muscle. Severe osteomyelitis with abscess in the right  space versus malignancy.    -S/p I&D 6/5  -Abscess cultures: Rare Bacillus species not anthracis   -Bone biopsy results: viable sclerotic bone, osteitis   -MRSA negative  -C/w Zosyn IV --> consider PICC. Vancomycin discontinued per ID recs  -ID consulted, appreciate recs CT Neck: Extensive aggressive appearing bone erosion throughout the mandible. Swollen complex septated fluid appearance throughout the right  space involving both sides of the masseter muscle. Severe osteomyelitis with abscess in the right  space versus malignancy.    -S/p I&D 6/5  -Abscess cultures: Rare Bacillus species not anthracis   -Bone biopsy results: viable sclerotic bone, osteitis   -MRSA negative  -C/w Zosyn IV (6/5- )--> consider midline for 2 more weeks on dc.   -ID consulted, appreciate recs

## 2024-06-12 NOTE — PROGRESS NOTE ADULT - PROBLEM SELECTOR PLAN 7
Addended by: ROBERT ORTEGA on: 3/31/2021 11:04 AM     Modules accepted: Orders     DVT ppx: lovenox  Diet: Soft and bite sized as tolerated

## 2024-06-12 NOTE — DIETITIAN INITIAL EVALUATION ADULT - OTHER INFO
Patient seen at bedside. Pt's tray observed at bedside with 100% PO intake. As per RN, Pt is a very good eater. Patient denies any nausea, vomiting, diarrhea, constipation or difficulty chewing and swallowing at this time. As per Pt last BM yesterday. Bowel regimen in place. As per [facility] weight hx: 113.4kg (6/4), 106.1kg (6/5), 101kg (6/12). Requested new weight to determine weight accuracy. Labs noted for hyponatremia, and low BUN. As per MD "Suspect SIADH due to pain". RDN services remain available as needed.

## 2024-06-12 NOTE — DIETITIAN INITIAL EVALUATION ADULT - REASON FOR ADMISSION
Per chart review, Pt is 68 years old male with PMH: smoking, schizophrenia, HTN, COPD, BPH,  muscle weakness, spondylosis with myelopathy, mild pulmonary HTN and moderate tricuspid regurgitation (TTE 2022); Presents as transfer from [facility] a/w severe osteomyelitis of the right mandible with abscess with associated leukocytosis, s/p I&D. Admission diagnosis, Inflammatory disorder of jaw

## 2024-06-12 NOTE — DIETITIAN INITIAL EVALUATION ADULT - PHYSCIAL ASSESSMENT
BMI reflective of physical appearance.  Pt with no overt signs of muscle wasting/fat loss upon visualization./obese

## 2024-06-12 NOTE — PROGRESS NOTE ADULT - SUBJECTIVE AND OBJECTIVE BOX
*******************************  Blanca Tucker MD (PGY-1)  Internal Medicine  Contact via [employer] TEAMS  *******************************    INTERVAL HPI/OVERNIGHT EVENTS:      OBJECTIVE  T(C): 37.1 (06-12-24 @ 05:19), Max: 37.1 (06-12-24 @ 05:19)  HR: 62 (06-12-24 @ 05:19) (61 - 80)  BP: 114/65 (06-12-24 @ 05:19) (114/65 - 141/88)  RR: 17 (06-12-24 @ 05:19) (17 - 18)  SpO2: 100% (06-12-24 @ 05:19) (100% - 100%)      PHYSICAL EXAM  General:  HEENT:  Cardiovascular:  Pulmonary:  GI:  :  Neuro:  Psych:          IMAGING:     *******************************  Blanca Tucker MD (PGY-1)  Internal Medicine  Contact via [employer] TEAMS  *******************************    INTERVAL HPI/OVERNIGHT EVENTS:  No acute overnight events.     OBJECTIVE  T(C): 37.1 (06-12-24 @ 05:19), Max: 37.1 (06-12-24 @ 05:19)  HR: 62 (06-12-24 @ 05:19) (61 - 80)  BP: 114/65 (06-12-24 @ 05:19) (114/65 - 141/88)  RR: 17 (06-12-24 @ 05:19) (17 - 18)  SpO2: 100% (06-12-24 @ 05:19) (100% - 100%)      PHYSICAL EXAM  GENERAL: NAD, lying in bed comfortably  EYES: EOMI, PERRLA, conjunctiva and sclera clear  HEENT : +mild R. sided facial and submandibular edema with tenderness  HEART: Regular rate and rhythm, no murmurs, rubs, or gallops  LUNGS: Unlabored respirations.   NERVOUS SYSTEM:  A&Ox1-2, moving all extremities, no focal deficits   PSYCH: Lacks capacity and insight, occasionally aggressive and agitated            IMAGING:

## 2024-06-12 NOTE — DISCHARGE NOTE NURSING/CASE MANAGEMENT/SOCIAL WORK - NSDPFAC_GEN_ALL_CORE
[facility] Address: [address] [place] [id number] Phone: [phone]

## 2024-06-12 NOTE — DIETITIAN INITIAL EVALUATION ADULT - ORAL INTAKE PTA/DIET HISTORY
Patient reports good appetite/PO intake PTA, consumes a regular diet at baseline. Pt confirms NKFA, food intolerance.

## 2024-06-13 VITALS
SYSTOLIC BLOOD PRESSURE: 110 MMHG | RESPIRATION RATE: 18 BRPM | HEART RATE: 75 BPM | DIASTOLIC BLOOD PRESSURE: 62 MMHG | TEMPERATURE: 98 F | OXYGEN SATURATION: 100 %

## 2024-06-13 PROCEDURE — 99232 SBSQ HOSP IP/OBS MODERATE 35: CPT | Mod: GC

## 2024-06-13 RX ORDER — PIPERACILLIN AND TAZOBACTAM 4; .5 G/20ML; G/20ML
3.38 INJECTION, POWDER, LYOPHILIZED, FOR SOLUTION INTRAVENOUS
Qty: 0 | Refills: 0 | DISCHARGE
Start: 2024-06-13 | End: 2024-06-24

## 2024-06-13 RX ORDER — AMLODIPINE BESYLATE 2.5 MG/1
1 TABLET ORAL
Refills: 0 | DISCHARGE

## 2024-06-13 RX ADMIN — DIVALPROEX SODIUM 250 MILLIGRAM(S): 500 TABLET, DELAYED RELEASE ORAL at 12:49

## 2024-06-13 RX ADMIN — BUDESONIDE AND FORMOTEROL FUMARATE DIHYDRATE 2 PUFF(S): 160; 4.5 AEROSOL RESPIRATORY (INHALATION) at 12:49

## 2024-06-13 RX ADMIN — PIPERACILLIN AND TAZOBACTAM 25 GRAM(S): 4; .5 INJECTION, POWDER, LYOPHILIZED, FOR SOLUTION INTRAVENOUS at 12:48

## 2024-06-13 RX ADMIN — GABAPENTIN 300 MILLIGRAM(S): 400 CAPSULE ORAL at 05:19

## 2024-06-13 RX ADMIN — Medication 200 MILLIGRAM(S): at 05:19

## 2024-06-13 RX ADMIN — PIPERACILLIN AND TAZOBACTAM 25 GRAM(S): 4; .5 INJECTION, POWDER, LYOPHILIZED, FOR SOLUTION INTRAVENOUS at 05:18

## 2024-06-13 RX ADMIN — Medication 0.5 MILLIGRAM(S): at 05:19

## 2024-06-13 RX ADMIN — FINASTERIDE 5 MILLIGRAM(S): 5 TABLET, FILM COATED ORAL at 12:49

## 2024-06-13 RX ADMIN — GABAPENTIN 300 MILLIGRAM(S): 400 CAPSULE ORAL at 12:49

## 2024-06-13 RX ADMIN — DIVALPROEX SODIUM 250 MILLIGRAM(S): 500 TABLET, DELAYED RELEASE ORAL at 05:19

## 2024-06-13 NOTE — PROGRESS NOTE ADULT - ASSESSMENT
69yo male, PMHx smoking, schizophrenia, HTN, COPD, BPH,  muscle weakness, spondylosis with myelopathy, mild pulmonary HTN and moderate tricuspid regurgitation (TTE 2022); Presents as transfer from [facility] a/w severe osteomyelitis of the right mandible with abscess with associated leukocytosis, s/p I&D.

## 2024-06-13 NOTE — PROGRESS NOTE ADULT - PROBLEM SELECTOR PROBLEM 1
Abscess of  space of mouth

## 2024-06-13 NOTE — PROGRESS NOTE ADULT - PROBLEM SELECTOR PROBLEM 5
Schizophrenia

## 2024-06-13 NOTE — PROGRESS NOTE ADULT - PROBLEM SELECTOR PLAN 1
CT Neck: Extensive aggressive appearing bone erosion throughout the mandible. Swollen complex septated fluid appearance throughout the right  space involving both sides of the masseter muscle. Severe osteomyelitis with abscess in the right  space versus malignancy.    -S/p I&D 6/5  -Abscess cultures: Rare Bacillus species not anthracis   -Bone biopsy results: viable sclerotic bone, osteitis   -MRSA negative  -C/w Zosyn IV (6/5- )--> consider midline for 2 more weeks on dc.   -ID consulted, appreciate recs CT Neck: Extensive aggressive appearing bone erosion throughout the mandible. Swollen complex septated fluid appearance throughout the right  space involving both sides of the masseter muscle. Severe osteomyelitis with abscess in the right  space versus malignancy.    -S/p I&D 6/5  -Abscess cultures: Rare Bacillus species not anthracis   -Bone biopsy results: viable sclerotic bone, osteitis   -MRSA negative  -C/w Zosyn IV (6/5- )--> has midline for prolonged antibiotics course on dc. Will also require PO augmentin for 3 weeks until 7/18 following IV abx completion  -ID consulted, appreciate recs

## 2024-06-13 NOTE — PROGRESS NOTE ADULT - ATTENDING COMMENTS
plan for IV zosyn x 2 weeks then po Augmentin x 3 weeks   dw ID and OMFS  stable for dc w out-pt OMFS f.u  for dc to [facility] today

## 2024-06-13 NOTE — PROGRESS NOTE ADULT - PROBLEM SELECTOR PROBLEM 6
History of BPH

## 2024-06-13 NOTE — PROGRESS NOTE ADULT - REASON FOR ADMISSION
Transferred from [facility] for OMFS consult,  osteomyelitis to right mandible with abscess
Transferred from [facility] for OMFS consult,  osteomyelitis to right mandible with abscess
Transferred from [facility] for OMFS consult,  osteomyelitis to right mandible with abscess
Transferred from [facility] for OMFS consult,  osteomyelitis to right mandible with abscess
Transferred from [facility] for OMFS consult,  osteomyelitis to right mandible with abscess
Transferred from [facility] for OMFS consult,  osteomyelitis to right mandible with abscess
Transferred from [facility] for OMFS consult,  osteomyelitis to right mandible with abscess
Transferred from [facility] for OMFS consult,  osteomyelitis to right mandible with abscess
Transferred from [facility] for OMFS consult,  osteomyelitis to right mandible with abscess
Transferred from [facility] for OMFS consult,  osteomyelitis to right mandible with abscess
Transferred from [facility] for OMFS consult,  osteomyelitis to right mandible with abscess
Transferred from [facility] for OMFS consult,  osteomyelitis to right mandible with abscess
Transferred from [facility] for OMFS consult,  osteomyelitis to right mandible with abscess
Transferred from [facility] for OMFS consult,  osteomyelitis to right mandible with abscess
Transferred from [facility] for OMFS consult,  osteomyelitis to right mandible with abscess
Transferred [facility] for OMFS consult,  osteomyelitis to right mandible with abscess
Transferred from [facility] for OMFS consult,  osteomyelitis to right mandible with abscess

## 2024-06-13 NOTE — PROGRESS NOTE ADULT - PROBLEM SELECTOR PLAN 5
On Prolixin Decanoate [id number]   - cannot leave AMA, lacks capacity to leave or refuse care   - c/w Zyprexa 10 mg qhs, Depakote 250mg q8H, cogentin 0.5 mg BID  - haldol 2.5 mg and ativan 1mg IV/IM q6H PRN for severe agitation   - psych following, appreciate recs

## 2024-06-13 NOTE — PROGRESS NOTE ADULT - SUBJECTIVE AND OBJECTIVE BOX
*******************************  Blanca Tucker MD (PGY-1)  Internal Medicine  Contact via [employer] TEAMS  *******************************    INTERVAL HPI/OVERNIGHT EVENTS:      OBJECTIVE  T(C): 36.7 (06-13-24 @ 04:56), Max: 36.7 (06-13-24 @ 04:56)  HR: 57 (06-13-24 @ 04:56) (57 - 68)  BP: 119/77 (06-13-24 @ 04:56) (108/71 - 131/80)  RR: 18 (06-13-24 @ 04:56) (17 - 18)  SpO2: 100% (06-13-24 @ 04:56) (97% - 100%)      PHYSICAL EXAM  General:  HEENT:  Cardiovascular:  Pulmonary:  GI:  :  Neuro:  Psych:          IMAGING:     *******************************  Blanca Tucker MD (PGY-1)  Internal Medicine  Contact via [employer] TEAMS  *******************************    INTERVAL HPI/OVERNIGHT EVENTS:  No acute overnight events.     OBJECTIVE  T(C): 36.7 (06-13-24 @ 04:56), Max: 36.7 (06-13-24 @ 04:56)  HR: 57 (06-13-24 @ 04:56) (57 - 68)  BP: 119/77 (06-13-24 @ 04:56) (108/71 - 131/80)  RR: 18 (06-13-24 @ 04:56) (17 - 18)  SpO2: 100% (06-13-24 @ 04:56) (97% - 100%)      PHYSICAL EXAM  GENERAL: NAD, lying in bed comfortably  EYES: EOMI, PERRLA, conjunctiva and sclera clear  HEENT : +mild R. sided facial and submandibular edema with tenderness  HEART: Regular rate and rhythm, no murmurs, rubs, or gallops  LUNGS: Unlabored respirations.   NERVOUS SYSTEM:  A&Ox1-2, moving all extremities, no focal deficits   PSYCH: Lacks capacity and insight, occasionally aggressive and agitated          IMAGING:

## 2024-06-13 NOTE — PROGRESS NOTE ADULT - PROBLEM SELECTOR PROBLEM 7
Encounter for deep vein thrombosis (DVT) prophylaxis

## 2024-06-13 NOTE — PROGRESS NOTE ADULT - PROVIDER SPECIALTY LIST ADULT
Infectious Disease
OMFS
Infectious Disease
OMFS
OMFS
Infectious Disease
Internal Medicine
Internal Medicine
OMFS
OMFS
Internal Medicine

## 2024-10-02 ENCOUNTER — MED ADMIN CHARGE (OUTPATIENT)
Age: 69
End: 2024-10-02

## 2024-10-02 VITALS
DIASTOLIC BLOOD PRESSURE: 74 MMHG | RESPIRATION RATE: 16 BRPM | HEART RATE: 77 BPM | SYSTOLIC BLOOD PRESSURE: 132 MMHG | WEIGHT: 240 LBS | OXYGEN SATURATION: 97 %

## 2024-10-02 DIAGNOSIS — I10 ESSENTIAL (PRIMARY) HYPERTENSION: ICD-10-CM

## 2024-10-02 DIAGNOSIS — Z23 ENCOUNTER FOR IMMUNIZATION: ICD-10-CM

## 2024-10-02 DIAGNOSIS — J44.9 CHRONIC OBSTRUCTIVE PULMONARY DISEASE, UNSPECIFIED: ICD-10-CM

## 2024-10-02 DIAGNOSIS — F20.9 SCHIZOPHRENIA, UNSPECIFIED: ICD-10-CM

## 2024-10-02 DIAGNOSIS — J45.901 UNSPECIFIED ASTHMA WITH (ACUTE) EXACERBATION: ICD-10-CM

## 2024-10-02 DIAGNOSIS — J45.40 MODERATE PERSISTENT ASTHMA, UNCOMPLICATED: ICD-10-CM

## 2024-10-02 DIAGNOSIS — N40.0 BENIGN PROSTATIC HYPERPLASIA WITHOUT LOWER URINARY TRACT SYMPMS: ICD-10-CM

## 2024-10-02 DIAGNOSIS — J45.909 UNSPECIFIED ASTHMA, UNCOMPLICATED: ICD-10-CM

## 2024-10-02 NOTE — HISTORY OF PRESENT ILLNESS
[FreeTextEntry8] : This a new resident at [facility].  Alert and Oriented x$.  Offers no acute concerns or complaints.  History and current medications reviewed.  He requested a flu shot as well.

## 2024-10-02 NOTE — ASSESSMENT
[FreeTextEntry1] : New resident to [facility].  Alert and oriented x4.  Offers no acute concerns or complaints.  History and current medications reviewed.  He requested a Flu shot as well.  Copd HTN Osteomyelitis BPH Asthma Dysphasia Schizophrenia

## 2024-10-02 NOTE — REVIEW OF SYSTEMS
[Negative] : Heme/Lymph [FreeTextEntry2] : offers no acute concerns [FreeTextEntry9] : needs rolling walker to ambulate

## 2024-10-02 NOTE — HEALTH RISK ASSESSMENT
[No] : No [No falls in past year] : Patient reported no falls in the past year [0] : 2) Feeling down, depressed, or hopeless: Not at all (0) [PHQ-2 Negative - No further assessment needed] : PHQ-2 Negative - No further assessment needed [[id number]] : 0

## 2024-10-04 ENCOUNTER — LABORATORY RESULT (OUTPATIENT)
Age: 69
End: 2024-10-04

## 2024-10-07 ENCOUNTER — LABORATORY RESULT (OUTPATIENT)
Age: 69
End: 2024-10-07

## 2024-10-17 RX ORDER — DOXAZOSIN 4 MG/1
4 TABLET ORAL
Qty: 60 | Refills: 3 | Status: ACTIVE | COMMUNITY
Start: 2024-10-17 | End: 1900-01-01

## 2024-10-29 RX ORDER — FINASTERIDE 5 MG/1
5 TABLET, FILM COATED ORAL DAILY
Qty: 30 | Refills: 3 | Status: ACTIVE | COMMUNITY
Start: 2024-10-29 | End: 1900-01-01

## 2024-10-29 RX ORDER — ACETAMINOPHEN 325 MG/1
325 TABLET ORAL
Qty: 240 | Refills: 3 | Status: ACTIVE | COMMUNITY
Start: 2024-10-29 | End: 1900-01-01

## 2024-10-29 RX ORDER — GLUCOSAMINE HCL/CHONDROITIN SU 500-400 MG
3 CAPSULE ORAL AT BEDTIME
Qty: 60 | Refills: 3 | Status: ACTIVE | COMMUNITY
Start: 2024-10-29 | End: 1900-01-01

## 2024-10-29 RX ORDER — LABETALOL HYDROCHLORIDE 200 MG/1
200 TABLET, FILM COATED ORAL
Qty: 60 | Refills: 3 | Status: ACTIVE | COMMUNITY
Start: 2024-10-29 | End: 1900-01-01

## 2024-11-06 RX ORDER — CALCIUM CARBONATE/VITAMIN D3 600 MG-20
600-20 TABLET ORAL DAILY
Qty: 30 | Refills: 3 | Status: ACTIVE | COMMUNITY
Start: 2024-11-06 | End: 1900-01-01

## 2024-11-06 RX ORDER — GABAPENTIN 300 MG/1
300 CAPSULE ORAL EVERY 8 HOURS
Qty: 90 | Refills: 2 | Status: ACTIVE | COMMUNITY
Start: 2024-11-06 | End: 1900-01-01

## 2024-11-11 VITALS
RESPIRATION RATE: 16 BRPM | OXYGEN SATURATION: 98 % | HEART RATE: 77 BPM | WEIGHT: 240 LBS | DIASTOLIC BLOOD PRESSURE: 82 MMHG | SYSTOLIC BLOOD PRESSURE: 140 MMHG

## 2024-11-11 DIAGNOSIS — J45.901 UNSPECIFIED ASTHMA WITH (ACUTE) EXACERBATION: ICD-10-CM

## 2024-11-11 DIAGNOSIS — N40.0 BENIGN PROSTATIC HYPERPLASIA WITHOUT LOWER URINARY TRACT SYMPMS: ICD-10-CM

## 2024-11-11 DIAGNOSIS — F20.9 SCHIZOPHRENIA, UNSPECIFIED: ICD-10-CM

## 2024-11-11 DIAGNOSIS — I10 ESSENTIAL (PRIMARY) HYPERTENSION: ICD-10-CM

## 2024-11-11 DIAGNOSIS — J44.9 CHRONIC OBSTRUCTIVE PULMONARY DISEASE, UNSPECIFIED: ICD-10-CM

## 2024-11-12 DIAGNOSIS — M62.81 MUSCLE WEAKNESS (GENERALIZED): ICD-10-CM

## 2024-11-12 DIAGNOSIS — R26.9 MUSCLE WEAKNESS (GENERALIZED): ICD-10-CM

## 2024-11-15 ENCOUNTER — NON-APPOINTMENT (OUTPATIENT)
Age: 69
End: 2024-11-15

## 2024-12-16 ENCOUNTER — NON-APPOINTMENT (OUTPATIENT)
Age: 69
End: 2024-12-16

## 2025-01-24 VITALS
RESPIRATION RATE: 16 BRPM | SYSTOLIC BLOOD PRESSURE: 130 MMHG | WEIGHT: 240 LBS | OXYGEN SATURATION: 98 % | HEART RATE: 74 BPM | DIASTOLIC BLOOD PRESSURE: 70 MMHG

## 2025-01-24 DIAGNOSIS — J44.9 CHRONIC OBSTRUCTIVE PULMONARY DISEASE, UNSPECIFIED: ICD-10-CM

## 2025-01-24 DIAGNOSIS — F20.9 SCHIZOPHRENIA, UNSPECIFIED: ICD-10-CM

## 2025-01-24 DIAGNOSIS — M62.81 MUSCLE WEAKNESS (GENERALIZED): ICD-10-CM

## 2025-01-24 DIAGNOSIS — R26.9 MUSCLE WEAKNESS (GENERALIZED): ICD-10-CM

## 2025-01-24 DIAGNOSIS — J45.901 UNSPECIFIED ASTHMA WITH (ACUTE) EXACERBATION: ICD-10-CM

## 2025-01-24 DIAGNOSIS — N40.0 BENIGN PROSTATIC HYPERPLASIA WITHOUT LOWER URINARY TRACT SYMPMS: ICD-10-CM

## 2025-01-24 DIAGNOSIS — I10 ESSENTIAL (PRIMARY) HYPERTENSION: ICD-10-CM

## 2025-02-10 DIAGNOSIS — J45.901 UNSPECIFIED ASTHMA WITH (ACUTE) EXACERBATION: ICD-10-CM

## 2025-02-10 DIAGNOSIS — I10 ESSENTIAL (PRIMARY) HYPERTENSION: ICD-10-CM

## 2025-02-10 DIAGNOSIS — F20.9 SCHIZOPHRENIA, UNSPECIFIED: ICD-10-CM

## 2025-02-10 DIAGNOSIS — R26.9 MUSCLE WEAKNESS (GENERALIZED): ICD-10-CM

## 2025-02-10 DIAGNOSIS — M62.81 MUSCLE WEAKNESS (GENERALIZED): ICD-10-CM

## 2025-02-10 DIAGNOSIS — J44.9 CHRONIC OBSTRUCTIVE PULMONARY DISEASE, UNSPECIFIED: ICD-10-CM

## 2025-02-10 DIAGNOSIS — N40.0 BENIGN PROSTATIC HYPERPLASIA WITHOUT LOWER URINARY TRACT SYMPMS: ICD-10-CM

## 2025-02-12 ENCOUNTER — LABORATORY RESULT (OUTPATIENT)
Age: 70
End: 2025-02-12

## 2025-02-21 ENCOUNTER — LABORATORY RESULT (OUTPATIENT)
Age: 70
End: 2025-02-21

## 2025-02-24 ENCOUNTER — LABORATORY RESULT (OUTPATIENT)
Age: 70
End: 2025-02-24

## 2025-02-27 VITALS
WEIGHT: 238 LBS | RESPIRATION RATE: 16 BRPM | SYSTOLIC BLOOD PRESSURE: 106 MMHG | DIASTOLIC BLOOD PRESSURE: 66 MMHG | HEART RATE: 78 BPM | OXYGEN SATURATION: 98 %

## 2025-02-27 DIAGNOSIS — J44.9 CHRONIC OBSTRUCTIVE PULMONARY DISEASE, UNSPECIFIED: ICD-10-CM

## 2025-02-27 DIAGNOSIS — F20.9 SCHIZOPHRENIA, UNSPECIFIED: ICD-10-CM

## 2025-02-27 DIAGNOSIS — M62.81 MUSCLE WEAKNESS (GENERALIZED): ICD-10-CM

## 2025-02-27 DIAGNOSIS — J45.901 UNSPECIFIED ASTHMA WITH (ACUTE) EXACERBATION: ICD-10-CM

## 2025-02-27 DIAGNOSIS — R26.9 MUSCLE WEAKNESS (GENERALIZED): ICD-10-CM

## 2025-02-27 DIAGNOSIS — I10 ESSENTIAL (PRIMARY) HYPERTENSION: ICD-10-CM

## 2025-02-27 DIAGNOSIS — N28.89 OTHER SPECIFIED DISORDERS OF KIDNEY AND URETER: ICD-10-CM

## 2025-02-27 DIAGNOSIS — N40.0 BENIGN PROSTATIC HYPERPLASIA WITHOUT LOWER URINARY TRACT SYMPMS: ICD-10-CM

## 2025-03-11 DIAGNOSIS — R35.89 OTHER POLYURIA: ICD-10-CM

## 2025-03-19 RX ORDER — BENZTROPINE MESYLATE 0.5 MG/1
0.5 TABLET ORAL
Qty: 60 | Refills: 3 | Status: ACTIVE | COMMUNITY
Start: 2025-03-19 | End: 1900-01-01

## 2025-03-25 RX ORDER — ASCORBIC ACID 500 MG
500 TABLET ORAL DAILY
Qty: 30 | Refills: 3 | Status: ACTIVE | COMMUNITY
Start: 2025-03-25 | End: 1900-01-01

## (undated) DEVICE — SUT CHROMIC 3-0 27" RB-1

## (undated) DEVICE — ELCTR GROUNDING PAD ADULT COVIDIEN

## (undated) DEVICE — SOL IRR POUR NS 0.9% 500ML

## (undated) DEVICE — GLV 8 PROTEXIS (WHITE)

## (undated) DEVICE — SUT CHROMIC 4-0 27" RB-1

## (undated) DEVICE — WARMING BLANKET FULL UNDERBODY

## (undated) DEVICE — SUT VICRYL 4-0 27" RB-1 UNDYED

## (undated) DEVICE — VENODYNE/SCD SLEEVE CALF MEDIUM

## (undated) DEVICE — LABELS BLANK W PEN

## (undated) DEVICE — POSITIONER FOAM EGG CRATE ULNAR 2PCS (PINK)

## (undated) DEVICE — DRAPE 3/4 SHEET 52X76"

## (undated) DEVICE — PREP BETADINE KIT

## (undated) DEVICE — PACK DENTAL MINOR

## (undated) DEVICE — DRAPE TOWEL BLUE 17" X 24"